# Patient Record
Sex: MALE | Race: WHITE | NOT HISPANIC OR LATINO | URBAN - METROPOLITAN AREA
[De-identification: names, ages, dates, MRNs, and addresses within clinical notes are randomized per-mention and may not be internally consistent; named-entity substitution may affect disease eponyms.]

---
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
## 2022-08-02 ENCOUNTER — OFFICE VISIT (OUTPATIENT)
Dept: FAMILY MEDICINE CLINIC | Facility: CLINIC | Age: 22
End: 2022-08-02

## 2022-08-02 VITALS
HEIGHT: 69 IN | OXYGEN SATURATION: 97 % | HEART RATE: 77 BPM | WEIGHT: 169 LBS | SYSTOLIC BLOOD PRESSURE: 120 MMHG | BODY MASS INDEX: 25.03 KG/M2 | TEMPERATURE: 97.8 F | DIASTOLIC BLOOD PRESSURE: 70 MMHG

## 2022-08-02 DIAGNOSIS — H72.92 RUPTURED EAR DRUM, LEFT: Primary | ICD-10-CM

## 2022-08-02 DIAGNOSIS — H92.02 OTALGIA OF LEFT EAR: ICD-10-CM

## 2022-08-02 DIAGNOSIS — H91.92 DECREASED HEARING OF LEFT EAR: ICD-10-CM

## 2022-08-02 PROCEDURE — 99213 OFFICE O/P EST LOW 20 MIN: CPT | Performed by: NURSE PRACTITIONER
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
## 2022-08-05 NOTE — PATIENT INSTRUCTIONS
Eardrum Rupture, Adult    An eardrum rupture is a hole (perforation) in the eardrum. The eardrum is a thin, round tissue inside of the ear that separates the ear canal from the middle ear. The eardrum is also called the tympanic membrane. It transfers sound vibrations through small bones in the middle ear to the hearing nerve in the inner ear. It also protects the middle ear from germs. An eardrum rupture can cause pain and hearing loss.  What are the causes?  This condition may be caused by:  An infection.  A sudden injury, such as from:  Inserting a thin, sharp object into the ear.  A hit to the side of the head, especially by an open hand.  Falling onto water or a flat surface.  A rapid change in pressure, such as from flying or scuba diving.  A sudden increase in pressure against the eardrum, such as from an explosion or a very loud noise.  Inserting a cotton-tipped swab in the ear.  A long-term eustachian tube disorder. Eustachian tubes are parts of the body that connect each middle ear space to the back of the nose.  A medical procedure or surgery, such as a procedure to remove wax from the ear canal.  Removing a man-made pressure equalization tube(PE tube) that was placed through the eardrum.  Having a PE tube fall out.  What increases the risk?  You are more likely to develop this condition if:  You have had PE tubes inserted in your ears.  You have an ear infection.  You play sports that:  Involve balls or contact with other players.  Take place in water, such as diving, scuba diving, or waterskiing.  What are the signs or symptoms?  Symptoms of this condition include:  Sudden pain at the time of the injury.  Ear pain that suddenly improves.  Ringing in the ear after the injury.  Drainage from the ear. The drainage may be clear, cloudy or pus-like, or bloody.  Hearing loss.  Dizziness.  How is this diagnosed?  This condition is diagnosed based on your symptoms and medical history as well as a physical  exam. Your health care provider can usually see a perforation using an ear scope (otoscope). You may have tests, such as:  A hearing test (audiogram) to check for hearing loss.  A test in which a sample of ear drainage is tested for infection (culture).  How is this treated?  An eardrum typically heals on its own within a few weeks. If your eardrum does not heal, your health care provider may recommend a procedure to place a patch over your eardrum or surgery to repair your eardrum. Your health care provider may also prescribe antibiotic medicines to help prevent infection.  If the ear heals completely, any hearing loss should be temporary.  Follow these instructions at home:  Keep your ear dry. This is very important. Follow instructions from your health care provider about how to keep your ear dry. You may need to wear waterproof earplugs when bathing and swimming.  Take over-the-counter and prescription medicines only as told by your health care provider.  Return to sports and activities as told by your health care provider. Ask your health care provider what activities are safe for you.  Wear headgear with ear protection when you play sports in which ear injuries are common.  If directed, apply heat to your affected ear as often as told by your health care provider. Use the heat source that your health care provider recommends, such as a moist heat pack or a heating pad. This will help to relieve pain.  Place a towel between your skin and the heat source.  Leave the heat on for 20-30 minutes.  Remove the heat if your skin turns bright red. This is especially important if you are unable to feel pain, heat, or cold. You may have a greater risk of getting burned.  Keep all follow-up visits as told by your health care provider. This is important.  Talk to your health care provider before traveling by plane.  Contact a health care provider if:  You have mucus or blood draining from your ear.  You have a fever.  You  have ear pain.  You have hearing loss, dizziness, or ringing in your ear.  Get help right away if:  You have sudden hearing loss.  You are very dizzy.  You have severe ear pain.  Your face feels weak or becomes paralyzed.  Summary  An eardrum rupture is a hole (perforation) in the eardrum that can cause pain and hearing loss. It is usually caused by a sudden injury to the ear.  The eardrum will likely heal on its own within a few weeks. In some cases, surgery may be necessary.  After the injury, follow instructions from your health care provider about how to keep your ear dry as it heals.  This information is not intended to replace advice given to you by your health care provider. Make sure you discuss any questions you have with your health care provider.  Document Revised: 11/30/2018 Document Reviewed: 02/23/2018  [employer] Patient Education © 2021 [employer]

## 2023-07-21 ENCOUNTER — PRE-ADMISSION TESTING (OUTPATIENT)
Dept: PREADMISSION TESTING | Facility: HOSPITAL | Age: 23
End: 2023-07-21
Payer: COMMERCIAL

## 2023-07-21 VITALS — WEIGHT: 171.3 LBS | BODY MASS INDEX: 25.37 KG/M2 | HEIGHT: 69 IN

## 2023-07-21 LAB
25(OH)D3 SERPL-MCNC: 36 NG/ML (ref 30–100)
ALBUMIN SERPL-MCNC: 5 G/DL (ref 3.5–5.2)
ALBUMIN/GLOB SERPL: 1.8 G/DL
ALP SERPL-CCNC: 74 U/L (ref 39–117)
ALT SERPL W P-5'-P-CCNC: 46 U/L (ref 1–41)
ANION GAP SERPL CALCULATED.3IONS-SCNC: 13 MMOL/L (ref 5–15)
APTT PPP: 25.7 SECONDS (ref 22–39)
AST SERPL-CCNC: 25 U/L (ref 1–40)
BASOPHILS # BLD AUTO: 0.04 10*3/MM3 (ref 0–0.2)
BASOPHILS NFR BLD AUTO: 0.6 % (ref 0–1.5)
BILIRUB SERPL-MCNC: 0.4 MG/DL (ref 0–1.2)
BUN SERPL-MCNC: 12 MG/DL (ref 6–20)
BUN/CREAT SERPL: 12.4 (ref 7–25)
CALCIUM SPEC-SCNC: 9.8 MG/DL (ref 8.6–10.5)
CHLORIDE SERPL-SCNC: 100 MMOL/L (ref 98–107)
CO2 SERPL-SCNC: 26 MMOL/L (ref 22–29)
CREAT SERPL-MCNC: 0.97 MG/DL (ref 0.76–1.27)
CRP SERPL-MCNC: 0.73 MG/DL (ref 0–0.5)
DEPRECATED RDW RBC AUTO: 41.6 FL (ref 37–54)
EGFRCR SERPLBLD CKD-EPI 2021: 113.9 ML/MIN/1.73
EOSINOPHIL # BLD AUTO: 0.16 10*3/MM3 (ref 0–0.4)
EOSINOPHIL NFR BLD AUTO: 2.6 % (ref 0.3–6.2)
ERYTHROCYTE [DISTWIDTH] IN BLOOD BY AUTOMATED COUNT: 12.9 % (ref 12.3–15.4)
ERYTHROCYTE [SEDIMENTATION RATE] IN BLOOD: 3 MM/HR (ref 0–15)
GLOBULIN UR ELPH-MCNC: 2.8 GM/DL
GLUCOSE SERPL-MCNC: 103 MG/DL (ref 65–99)
HBA1C MFR BLD: 5 % (ref 4.8–5.6)
HCT VFR BLD AUTO: 47 % (ref 37.5–51)
HGB BLD-MCNC: 15.7 G/DL (ref 13–17.7)
IMM GRANULOCYTES # BLD AUTO: 0.01 10*3/MM3 (ref 0–0.05)
IMM GRANULOCYTES NFR BLD AUTO: 0.2 % (ref 0–0.5)
INR PPP: 0.92 (ref 0.89–1.12)
LYMPHOCYTES # BLD AUTO: 1.96 10*3/MM3 (ref 0.7–3.1)
LYMPHOCYTES NFR BLD AUTO: 31.7 % (ref 19.6–45.3)
MCH RBC QN AUTO: 29.4 PG (ref 26.6–33)
MCHC RBC AUTO-ENTMCNC: 33.4 G/DL (ref 31.5–35.7)
MCV RBC AUTO: 88 FL (ref 79–97)
MONOCYTES # BLD AUTO: 0.71 10*3/MM3 (ref 0.1–0.9)
MONOCYTES NFR BLD AUTO: 11.5 % (ref 5–12)
NEUTROPHILS NFR BLD AUTO: 3.3 10*3/MM3 (ref 1.7–7)
NEUTROPHILS NFR BLD AUTO: 53.4 % (ref 42.7–76)
NRBC BLD AUTO-RTO: 0 /100 WBC (ref 0–0.2)
PLATELET # BLD AUTO: 275 10*3/MM3 (ref 140–450)
PMV BLD AUTO: 10.1 FL (ref 6–12)
POTASSIUM SERPL-SCNC: 3.9 MMOL/L (ref 3.5–5.2)
PROT SERPL-MCNC: 7.8 G/DL (ref 6–8.5)
PROTHROMBIN TIME: 12.5 SECONDS (ref 12.2–14.5)
QT INTERVAL: 380 MS
QTC INTERVAL: 404 MS
RBC # BLD AUTO: 5.34 10*6/MM3 (ref 4.14–5.8)
SODIUM SERPL-SCNC: 139 MMOL/L (ref 136–145)
WBC NRBC COR # BLD: 6.18 10*3/MM3 (ref 3.4–10.8)

## 2023-07-21 PROCEDURE — 93005 ELECTROCARDIOGRAM TRACING: CPT

## 2023-07-21 PROCEDURE — G0480 DRUG TEST DEF 1-7 CLASSES: HCPCS

## 2023-07-21 PROCEDURE — 85610 PROTHROMBIN TIME: CPT

## 2023-07-21 PROCEDURE — 80053 COMPREHEN METABOLIC PANEL: CPT

## 2023-07-21 PROCEDURE — 82306 VITAMIN D 25 HYDROXY: CPT

## 2023-07-21 PROCEDURE — 83036 HEMOGLOBIN GLYCOSYLATED A1C: CPT

## 2023-07-21 PROCEDURE — 85730 THROMBOPLASTIN TIME PARTIAL: CPT

## 2023-07-21 PROCEDURE — 36415 COLL VENOUS BLD VENIPUNCTURE: CPT

## 2023-07-21 PROCEDURE — 85025 COMPLETE CBC W/AUTO DIFF WBC: CPT

## 2023-07-21 PROCEDURE — 82985 ASSAY OF GLYCATED PROTEIN: CPT

## 2023-07-21 PROCEDURE — 85652 RBC SED RATE AUTOMATED: CPT

## 2023-07-21 PROCEDURE — 87081 CULTURE SCREEN ONLY: CPT

## 2023-07-21 PROCEDURE — 93010 ELECTROCARDIOGRAM REPORT: CPT | Performed by: INTERNAL MEDICINE

## 2023-07-21 PROCEDURE — 86140 C-REACTIVE PROTEIN: CPT

## 2023-07-21 NOTE — PAT
An arrival time for procedure was not provided during PAT visit. If patient had any questions or concerns about their arrival time, they were instructed to contact their surgeon/physician.  Additionally, if the patient referred to an arrival time that was acquired from their my chart account, patient was encouraged to verify that time with their surgeon/physician. Arrival times are NOT provided in Pre Admission Testing Department.    Discussed with patient options for receiving total joint replacement education and assessed patient's ability and preference. Joint Replacement Guide given to patient during PAT visit since not received a copy within the last year. Encouraged patient/family to read guide thoroughly and notify PAT staff with any questions or concerns. Handout provided directing patient to links to watch online videos related to joint replacement surgery on the [facility] website. The handout gives detailed instructions for joining an online joint replacement class through Zoom or phone conference offered on . Patient agreed to participate by watching videos online. Patient verbalized understanding of instructions and to complete the online learning tool survey. Encouraged to share information with family and/or . An overview of the joint replacement education was provided during the visit including general perioperative instructions that are routine for all surgical patients (PAT PASS, wipes, directions to pre-op, etc.).    Patient instructed to drink 20 ounces of Gatorade and it needs to be completed 1 hour (for Main OR patients) or 2 hours (scheduled  section & [facility] patients) before given arrival time for procedure (NO RED Gatorade)    Patient verbalized understanding.    Patient denies any current skin issues.     Patient to apply READY BATH LUXE wipes (allergic to CHG) to surgical area (as instructed) the night before procedure and the AM of procedure. Wipes  provided.    Post-Surgery Information Instruction Sheet given to patient during Pre-Admission Testing Visit with verbal instructions to patient to return with PAT PASS on the day of surgery. Additionally, encouraged patient to review the information provided.    EKG from PAT today faxed to anesthesiology department for review and cardiac clearance. RN spoke with Dr. Lee and reviewed pertinent medical history and EKG results.  Per Dr Lee, patient is cleared to proceed with procedure as planned without additional cardiac testing. Patient denies chest pain or increased shortness of breath.

## 2023-07-22 LAB
FRUCTOSAMINE SERPL-SCNC: 215 UMOL/L (ref 0–285)
MRSA SPEC QL CULT: NORMAL

## 2023-07-25 LAB
COTININE SERPL-MCNC: 184.1 NG/ML
NICOTINE SERPL-MCNC: 22.7 NG/ML

## 2023-08-02 ENCOUNTER — ANESTHESIA (OUTPATIENT)
Dept: PERIOP | Facility: HOSPITAL | Age: 23
End: 2023-08-02
Payer: COMMERCIAL

## 2023-08-02 ENCOUNTER — APPOINTMENT (OUTPATIENT)
Dept: GENERAL RADIOLOGY | Facility: HOSPITAL | Age: 23
End: 2023-08-02
Payer: COMMERCIAL

## 2023-08-02 ENCOUNTER — HOSPITAL ENCOUNTER (OUTPATIENT)
Facility: HOSPITAL | Age: 23
Discharge: HOME | End: 2023-08-02
Attending: ORTHOPAEDIC SURGERY | Admitting: ORTHOPAEDIC SURGERY
Payer: COMMERCIAL

## 2023-08-02 ENCOUNTER — ANESTHESIA EVENT (OUTPATIENT)
Dept: PERIOP | Facility: HOSPITAL | Age: 23
End: 2023-08-02
Payer: COMMERCIAL

## 2023-08-02 VITALS
DIASTOLIC BLOOD PRESSURE: 51 MMHG | OXYGEN SATURATION: 100 % | HEIGHT: 69 IN | HEART RATE: 63 BPM | WEIGHT: 171 LBS | TEMPERATURE: 97.8 F | RESPIRATION RATE: 16 BRPM | BODY MASS INDEX: 25.33 KG/M2 | SYSTOLIC BLOOD PRESSURE: 99 MMHG

## 2023-08-02 DIAGNOSIS — Z96.642 STATUS POST TOTAL REPLACEMENT OF LEFT HIP: Primary | ICD-10-CM

## 2023-08-02 PROBLEM — M87.052 AVASCULAR NECROSIS OF BONE OF LEFT HIP: Status: ACTIVE | Noted: 2023-08-02

## 2023-08-02 PROCEDURE — 97165 OT EVAL LOW COMPLEX 30 MIN: CPT | Performed by: OCCUPATIONAL THERAPIST

## 2023-08-02 PROCEDURE — 25010000002 CEFAZOLIN IN DEXTROSE 2-4 GM/100ML-% SOLUTION: Performed by: ORTHOPAEDIC SURGERY

## 2023-08-02 PROCEDURE — 25010000002 CLONIDINE PER 1 MG: Performed by: ORTHOPAEDIC SURGERY

## 2023-08-02 PROCEDURE — 25010000002 BUPIVACAINE (PF) 0.25 % SOLUTION 30 ML VIAL: Performed by: ORTHOPAEDIC SURGERY

## 2023-08-02 PROCEDURE — 25010000002 ONDANSETRON PER 1 MG: Performed by: ANESTHESIOLOGY

## 2023-08-02 PROCEDURE — C1713 ANCHOR/SCREW BN/BN,TIS/BN: HCPCS | Performed by: ORTHOPAEDIC SURGERY

## 2023-08-02 PROCEDURE — 73502 X-RAY EXAM HIP UNI 2-3 VIEWS: CPT

## 2023-08-02 PROCEDURE — 25010000002 KETOROLAC TROMETHAMINE PER 15 MG: Performed by: ORTHOPAEDIC SURGERY

## 2023-08-02 PROCEDURE — 0 MEPERIDINE PER 100 MG

## 2023-08-02 PROCEDURE — 25010000002 CEFAZOLIN IN DEXTROSE 2000 MG/ 100 ML SOLUTION: Performed by: ORTHOPAEDIC SURGERY

## 2023-08-02 PROCEDURE — 97535 SELF CARE MNGMENT TRAINING: CPT | Performed by: OCCUPATIONAL THERAPIST

## 2023-08-02 PROCEDURE — C1776 JOINT DEVICE (IMPLANTABLE): HCPCS | Performed by: ORTHOPAEDIC SURGERY

## 2023-08-02 PROCEDURE — 25010000002 FENTANYL CITRATE (PF) 50 MCG/ML SOLUTION

## 2023-08-02 PROCEDURE — 76000 FLUOROSCOPY <1 HR PHYS/QHP: CPT

## 2023-08-02 PROCEDURE — 27130 TOTAL HIP ARTHROPLASTY: CPT | Performed by: PHYSICIAN ASSISTANT

## 2023-08-02 PROCEDURE — 97161 PT EVAL LOW COMPLEX 20 MIN: CPT

## 2023-08-02 PROCEDURE — 25010000002 VANCOMYCIN 1 G RECONSTITUTED SOLUTION: Performed by: ORTHOPAEDIC SURGERY

## 2023-08-02 PROCEDURE — 25010000002 DEXAMETHASONE PER 1 MG: Performed by: ANESTHESIOLOGY

## 2023-08-02 PROCEDURE — 25010000002 PROPOFOL 10 MG/ML EMULSION: Performed by: ANESTHESIOLOGY

## 2023-08-02 PROCEDURE — 25010000002 HYDROMORPHONE 1 MG/ML SOLUTION

## 2023-08-02 PROCEDURE — 0 MEPIVACAINE HCL (PF) 1.5 % SOLUTION: Performed by: ANESTHESIOLOGY

## 2023-08-02 PROCEDURE — 97530 THERAPEUTIC ACTIVITIES: CPT

## 2023-08-02 RX ORDER — TRANEXAMIC ACID 10 MG/ML
1000 INJECTION, SOLUTION INTRAVENOUS ONCE
Status: COMPLETED | OUTPATIENT
Start: 2023-08-02 | End: 2023-08-02

## 2023-08-02 RX ORDER — MELOXICAM 15 MG/1
15 TABLET ORAL DAILY
Status: DISCONTINUED | OUTPATIENT
Start: 2023-08-03 | End: 2023-08-02 | Stop reason: HOSPADM

## 2023-08-02 RX ORDER — ONDANSETRON 2 MG/ML
4 INJECTION INTRAMUSCULAR; INTRAVENOUS EVERY 6 HOURS PRN
Status: DISCONTINUED | OUTPATIENT
Start: 2023-08-02 | End: 2023-08-02 | Stop reason: HOSPADM

## 2023-08-02 RX ORDER — MAGNESIUM HYDROXIDE 1200 MG/15ML
LIQUID ORAL AS NEEDED
Status: DISCONTINUED | OUTPATIENT
Start: 2023-08-02 | End: 2023-08-02 | Stop reason: HOSPADM

## 2023-08-02 RX ORDER — TRAMADOL HYDROCHLORIDE 50 MG/1
50 TABLET ORAL EVERY 8 HOURS PRN
Start: 2023-08-02 | End: 2023-08-09

## 2023-08-02 RX ORDER — HYDROCODONE BITARTRATE AND ACETAMINOPHEN 5; 325 MG/1; MG/1
1 TABLET ORAL ONCE AS NEEDED
Status: DISCONTINUED | OUTPATIENT
Start: 2023-08-02 | End: 2023-08-02

## 2023-08-02 RX ORDER — SODIUM CHLORIDE 9 MG/ML
40 INJECTION, SOLUTION INTRAVENOUS AS NEEDED
Status: DISCONTINUED | OUTPATIENT
Start: 2023-08-02 | End: 2023-08-02

## 2023-08-02 RX ORDER — IPRATROPIUM BROMIDE AND ALBUTEROL SULFATE 2.5; .5 MG/3ML; MG/3ML
3 SOLUTION RESPIRATORY (INHALATION) ONCE AS NEEDED
Status: DISCONTINUED | OUTPATIENT
Start: 2023-08-02 | End: 2023-08-02

## 2023-08-02 RX ORDER — SODIUM CHLORIDE 9 MG/ML
40 INJECTION, SOLUTION INTRAVENOUS AS NEEDED
Status: CANCELLED | OUTPATIENT
Start: 2023-08-02

## 2023-08-02 RX ORDER — DOXYCYCLINE HYCLATE 100 MG/1
100 CAPSULE ORAL 2 TIMES DAILY
Start: 2023-08-02

## 2023-08-02 RX ORDER — SODIUM CHLORIDE, SODIUM LACTATE, POTASSIUM CHLORIDE, CALCIUM CHLORIDE 600; 310; 30; 20 MG/100ML; MG/100ML; MG/100ML; MG/100ML
100 INJECTION, SOLUTION INTRAVENOUS CONTINUOUS
Status: DISCONTINUED | OUTPATIENT
Start: 2023-08-02 | End: 2023-08-02 | Stop reason: HOSPADM

## 2023-08-02 RX ORDER — FAMOTIDINE 20 MG/1
20 TABLET, FILM COATED ORAL
Status: COMPLETED | OUTPATIENT
Start: 2023-08-02 | End: 2023-08-02

## 2023-08-02 RX ORDER — SODIUM CHLORIDE 0.9 % (FLUSH) 0.9 %
10 SYRINGE (ML) INJECTION AS NEEDED
Status: CANCELLED | OUTPATIENT
Start: 2023-08-02

## 2023-08-02 RX ORDER — LABETALOL HYDROCHLORIDE 5 MG/ML
5 INJECTION, SOLUTION INTRAVENOUS
Status: DISCONTINUED | OUTPATIENT
Start: 2023-08-02 | End: 2023-08-02

## 2023-08-02 RX ORDER — SODIUM CHLORIDE 0.9 % (FLUSH) 0.9 %
3 SYRINGE (ML) INJECTION EVERY 12 HOURS SCHEDULED
Status: DISCONTINUED | OUTPATIENT
Start: 2023-08-02 | End: 2023-08-02

## 2023-08-02 RX ORDER — MELOXICAM 15 MG/1
15 TABLET ORAL ONCE
Status: COMPLETED | OUTPATIENT
Start: 2023-08-02 | End: 2023-08-02

## 2023-08-02 RX ORDER — ONDANSETRON 2 MG/ML
4 INJECTION INTRAMUSCULAR; INTRAVENOUS EVERY 6 HOURS PRN
Start: 2023-08-02

## 2023-08-02 RX ORDER — HYDRALAZINE HYDROCHLORIDE 20 MG/ML
5 INJECTION INTRAMUSCULAR; INTRAVENOUS
Status: DISCONTINUED | OUTPATIENT
Start: 2023-08-02 | End: 2023-08-02

## 2023-08-02 RX ORDER — NALOXONE HCL 0.4 MG/ML
0.4 VIAL (ML) INJECTION AS NEEDED
Status: DISCONTINUED | OUTPATIENT
Start: 2023-08-02 | End: 2023-08-02

## 2023-08-02 RX ORDER — LIDOCAINE HYDROCHLORIDE 10 MG/ML
0.5 INJECTION, SOLUTION EPIDURAL; INFILTRATION; INTRACAUDAL; PERINEURAL ONCE AS NEEDED
Status: COMPLETED | OUTPATIENT
Start: 2023-08-02 | End: 2023-08-02

## 2023-08-02 RX ORDER — ACETAMINOPHEN 500 MG
1000 TABLET ORAL ONCE
Status: COMPLETED | OUTPATIENT
Start: 2023-08-02 | End: 2023-08-02

## 2023-08-02 RX ORDER — ONDANSETRON 2 MG/ML
4 INJECTION INTRAMUSCULAR; INTRAVENOUS ONCE AS NEEDED
Status: DISCONTINUED | OUTPATIENT
Start: 2023-08-02 | End: 2023-08-02

## 2023-08-02 RX ORDER — ASPIRIN 81 MG/1
81 TABLET ORAL EVERY 12 HOURS SCHEDULED
Start: 2023-08-03

## 2023-08-02 RX ORDER — SODIUM CHLORIDE 0.9 % (FLUSH) 0.9 %
3-10 SYRINGE (ML) INJECTION AS NEEDED
Status: DISCONTINUED | OUTPATIENT
Start: 2023-08-02 | End: 2023-08-02

## 2023-08-02 RX ORDER — CEFAZOLIN SODIUM 2 G/100ML
2 INJECTION, SOLUTION INTRAVENOUS EVERY 8 HOURS
Status: DISCONTINUED | OUTPATIENT
Start: 2023-08-02 | End: 2023-08-02 | Stop reason: HOSPADM

## 2023-08-02 RX ORDER — FENTANYL CITRATE 50 UG/ML
INJECTION, SOLUTION INTRAMUSCULAR; INTRAVENOUS
Status: COMPLETED
Start: 2023-08-02 | End: 2023-08-02

## 2023-08-02 RX ORDER — SODIUM CHLORIDE, SODIUM LACTATE, POTASSIUM CHLORIDE, CALCIUM CHLORIDE 600; 310; 30; 20 MG/100ML; MG/100ML; MG/100ML; MG/100ML
9 INJECTION, SOLUTION INTRAVENOUS CONTINUOUS PRN
Status: DISCONTINUED | OUTPATIENT
Start: 2023-08-02 | End: 2023-08-02 | Stop reason: HOSPADM

## 2023-08-02 RX ORDER — DROPERIDOL 2.5 MG/ML
0.62 INJECTION, SOLUTION INTRAMUSCULAR; INTRAVENOUS
Status: DISCONTINUED | OUTPATIENT
Start: 2023-08-02 | End: 2023-08-02

## 2023-08-02 RX ORDER — ONDANSETRON 2 MG/ML
INJECTION INTRAMUSCULAR; INTRAVENOUS AS NEEDED
Status: DISCONTINUED | OUTPATIENT
Start: 2023-08-02 | End: 2023-08-02 | Stop reason: SURG

## 2023-08-02 RX ORDER — PROPOFOL 10 MG/ML
VIAL (ML) INTRAVENOUS AS NEEDED
Status: DISCONTINUED | OUTPATIENT
Start: 2023-08-02 | End: 2023-08-02 | Stop reason: SURG

## 2023-08-02 RX ORDER — OXYCODONE HYDROCHLORIDE 10 MG/1
10 TABLET ORAL EVERY 4 HOURS PRN
Status: DISCONTINUED | OUTPATIENT
Start: 2023-08-02 | End: 2023-08-02

## 2023-08-02 RX ORDER — PROMETHAZINE HYDROCHLORIDE 25 MG/1
25 TABLET ORAL ONCE AS NEEDED
Status: DISCONTINUED | OUTPATIENT
Start: 2023-08-02 | End: 2023-08-02

## 2023-08-02 RX ORDER — MEPERIDINE HYDROCHLORIDE 25 MG/ML
12.5 INJECTION INTRAMUSCULAR; INTRAVENOUS; SUBCUTANEOUS
Status: DISCONTINUED | OUTPATIENT
Start: 2023-08-02 | End: 2023-08-02

## 2023-08-02 RX ORDER — SODIUM CHLORIDE 0.9 % (FLUSH) 0.9 %
10 SYRINGE (ML) INJECTION EVERY 12 HOURS SCHEDULED
Status: CANCELLED | OUTPATIENT
Start: 2023-08-02

## 2023-08-02 RX ORDER — CEFAZOLIN SODIUM 2 G/100ML
2000 INJECTION, SOLUTION INTRAVENOUS ONCE
Status: COMPLETED | OUTPATIENT
Start: 2023-08-02 | End: 2023-08-02

## 2023-08-02 RX ORDER — MELOXICAM 15 MG/1
15 TABLET ORAL DAILY
Start: 2023-08-03

## 2023-08-02 RX ORDER — ASPIRIN 81 MG/1
81 TABLET ORAL EVERY 12 HOURS SCHEDULED
Status: DISCONTINUED | OUTPATIENT
Start: 2023-08-03 | End: 2023-08-02 | Stop reason: HOSPADM

## 2023-08-02 RX ORDER — OXYCODONE HYDROCHLORIDE 5 MG/1
5 TABLET ORAL EVERY 4 HOURS PRN
Refills: 0
Start: 2023-08-02 | End: 2023-08-12

## 2023-08-02 RX ORDER — DOCUSATE SODIUM 100 MG/1
100 CAPSULE, LIQUID FILLED ORAL 2 TIMES DAILY
Qty: 60 CAPSULE | Refills: 0
Start: 2023-08-02

## 2023-08-02 RX ORDER — MEPERIDINE HYDROCHLORIDE 25 MG/ML
INJECTION INTRAMUSCULAR; INTRAVENOUS; SUBCUTANEOUS
Status: COMPLETED
Start: 2023-08-02 | End: 2023-08-02

## 2023-08-02 RX ORDER — HYDROMORPHONE HYDROCHLORIDE 1 MG/ML
0.5 INJECTION, SOLUTION INTRAMUSCULAR; INTRAVENOUS; SUBCUTANEOUS
Status: DISCONTINUED | OUTPATIENT
Start: 2023-08-02 | End: 2023-08-02

## 2023-08-02 RX ORDER — TRAMADOL HYDROCHLORIDE 50 MG/1
50 TABLET ORAL EVERY 8 HOURS PRN
Status: DISCONTINUED | OUTPATIENT
Start: 2023-08-02 | End: 2023-08-02 | Stop reason: HOSPADM

## 2023-08-02 RX ORDER — DEXAMETHASONE SODIUM PHOSPHATE 4 MG/ML
INJECTION, SOLUTION INTRA-ARTICULAR; INTRALESIONAL; INTRAMUSCULAR; INTRAVENOUS; SOFT TISSUE AS NEEDED
Status: DISCONTINUED | OUTPATIENT
Start: 2023-08-02 | End: 2023-08-02 | Stop reason: SURG

## 2023-08-02 RX ORDER — ACETAMINOPHEN 500 MG
1000 TABLET ORAL EVERY 8 HOURS
Status: DISCONTINUED | OUTPATIENT
Start: 2023-08-02 | End: 2023-08-02 | Stop reason: HOSPADM

## 2023-08-02 RX ORDER — LIDOCAINE HYDROCHLORIDE 10 MG/ML
INJECTION, SOLUTION EPIDURAL; INFILTRATION; INTRACAUDAL; PERINEURAL AS NEEDED
Status: DISCONTINUED | OUTPATIENT
Start: 2023-08-02 | End: 2023-08-02 | Stop reason: SURG

## 2023-08-02 RX ORDER — EPHEDRINE SULFATE 50 MG/ML
INJECTION INTRAVENOUS AS NEEDED
Status: DISCONTINUED | OUTPATIENT
Start: 2023-08-02 | End: 2023-08-02 | Stop reason: SURG

## 2023-08-02 RX ORDER — ACETAMINOPHEN 500 MG
1000 TABLET ORAL EVERY 8 HOURS
Start: 2023-08-02

## 2023-08-02 RX ORDER — VANCOMYCIN HYDROCHLORIDE 1 G/20ML
INJECTION, POWDER, LYOPHILIZED, FOR SOLUTION INTRAVENOUS AS NEEDED
Status: DISCONTINUED | OUTPATIENT
Start: 2023-08-02 | End: 2023-08-02 | Stop reason: HOSPADM

## 2023-08-02 RX ORDER — DEXMEDETOMIDINE HYDROCHLORIDE 100 UG/ML
INJECTION, SOLUTION INTRAVENOUS AS NEEDED
Status: DISCONTINUED | OUTPATIENT
Start: 2023-08-02 | End: 2023-08-02 | Stop reason: SURG

## 2023-08-02 RX ORDER — KETOROLAC TROMETHAMINE 15 MG/ML
15 INJECTION, SOLUTION INTRAMUSCULAR; INTRAVENOUS EVERY 6 HOURS PRN
Status: DISCONTINUED | OUTPATIENT
Start: 2023-08-02 | End: 2023-08-02 | Stop reason: HOSPADM

## 2023-08-02 RX ORDER — MIDAZOLAM HYDROCHLORIDE 1 MG/ML
1 INJECTION INTRAMUSCULAR; INTRAVENOUS
Status: DISCONTINUED | OUTPATIENT
Start: 2023-08-02 | End: 2023-08-02 | Stop reason: HOSPADM

## 2023-08-02 RX ORDER — OXYCODONE HYDROCHLORIDE 5 MG/1
5 TABLET ORAL EVERY 4 HOURS PRN
Status: DISCONTINUED | OUTPATIENT
Start: 2023-08-02 | End: 2023-08-02 | Stop reason: HOSPADM

## 2023-08-02 RX ORDER — PROMETHAZINE HYDROCHLORIDE 25 MG/1
25 SUPPOSITORY RECTAL ONCE AS NEEDED
Status: DISCONTINUED | OUTPATIENT
Start: 2023-08-02 | End: 2023-08-02

## 2023-08-02 RX ORDER — FENTANYL CITRATE 50 UG/ML
50 INJECTION, SOLUTION INTRAMUSCULAR; INTRAVENOUS
Status: DISCONTINUED | OUTPATIENT
Start: 2023-08-02 | End: 2023-08-02

## 2023-08-02 RX ORDER — OXYCODONE HYDROCHLORIDE 10 MG/1
10 TABLET ORAL EVERY 4 HOURS PRN
Status: DISCONTINUED | OUTPATIENT
Start: 2023-08-02 | End: 2023-08-02 | Stop reason: HOSPADM

## 2023-08-02 RX ORDER — TRAMADOL HYDROCHLORIDE 50 MG/1
50 TABLET ORAL EVERY 8 HOURS PRN
Status: DISCONTINUED | OUTPATIENT
Start: 2023-08-02 | End: 2023-08-02

## 2023-08-02 RX ORDER — LABETALOL HYDROCHLORIDE 5 MG/ML
10 INJECTION, SOLUTION INTRAVENOUS EVERY 4 HOURS PRN
Status: DISCONTINUED | OUTPATIENT
Start: 2023-08-02 | End: 2023-08-02 | Stop reason: HOSPADM

## 2023-08-02 RX ORDER — DROPERIDOL 2.5 MG/ML
0.62 INJECTION, SOLUTION INTRAMUSCULAR; INTRAVENOUS ONCE AS NEEDED
Status: DISCONTINUED | OUTPATIENT
Start: 2023-08-02 | End: 2023-08-02

## 2023-08-02 RX ORDER — NALOXONE HCL 0.4 MG/ML
0.1 VIAL (ML) INJECTION
Status: DISCONTINUED | OUTPATIENT
Start: 2023-08-02 | End: 2023-08-02 | Stop reason: HOSPADM

## 2023-08-02 RX ADMIN — TRANEXAMIC ACID 1000 MG: 10 INJECTION, SOLUTION INTRAVENOUS at 08:00

## 2023-08-02 RX ADMIN — DEXMEDETOMIDINE HYDROCHLORIDE 8 MCG: 100 INJECTION, SOLUTION INTRAVENOUS at 10:20

## 2023-08-02 RX ADMIN — HYDROMORPHONE HYDROCHLORIDE 0.5 MG: 1 INJECTION, SOLUTION INTRAMUSCULAR; INTRAVENOUS; SUBCUTANEOUS at 11:10

## 2023-08-02 RX ADMIN — FAMOTIDINE 20 MG: 20 TABLET ORAL at 07:02

## 2023-08-02 RX ADMIN — DEXAMETHASONE SODIUM PHOSPHATE 8 MG: 4 INJECTION, SOLUTION INTRAMUSCULAR; INTRAVENOUS at 08:10

## 2023-08-02 RX ADMIN — PROPOFOL 50 MG: 10 INJECTION, EMULSION INTRAVENOUS at 08:16

## 2023-08-02 RX ADMIN — LIDOCAINE HYDROCHLORIDE 0.5 ML: 10 INJECTION, SOLUTION EPIDURAL; INFILTRATION; INTRACAUDAL; PERINEURAL at 06:55

## 2023-08-02 RX ADMIN — DEXMEDETOMIDINE HYDROCHLORIDE 4 MCG: 100 INJECTION, SOLUTION INTRAVENOUS at 11:02

## 2023-08-02 RX ADMIN — PROPOFOL 50 MG: 10 INJECTION, EMULSION INTRAVENOUS at 09:51

## 2023-08-02 RX ADMIN — TRANEXAMIC ACID 1000 MG: 10 INJECTION, SOLUTION INTRAVENOUS at 10:18

## 2023-08-02 RX ADMIN — FENTANYL CITRATE 50 MCG: 50 INJECTION, SOLUTION INTRAMUSCULAR; INTRAVENOUS at 11:45

## 2023-08-02 RX ADMIN — ACETAMINOPHEN 1000 MG: 500 TABLET ORAL at 12:45

## 2023-08-02 RX ADMIN — PROPOFOL 50 MG: 10 INJECTION, EMULSION INTRAVENOUS at 09:27

## 2023-08-02 RX ADMIN — SODIUM CHLORIDE, POTASSIUM CHLORIDE, SODIUM LACTATE AND CALCIUM CHLORIDE 100 ML/HR: 600; 310; 30; 20 INJECTION, SOLUTION INTRAVENOUS at 12:48

## 2023-08-02 RX ADMIN — HYDROMORPHONE HYDROCHLORIDE 0.5 MG: 1 INJECTION, SOLUTION INTRAMUSCULAR; INTRAVENOUS; SUBCUTANEOUS at 11:48

## 2023-08-02 RX ADMIN — PROPOFOL 150 MCG/KG/MIN: 10 INJECTION, EMULSION INTRAVENOUS at 07:53

## 2023-08-02 RX ADMIN — LIDOCAINE HYDROCHLORIDE 50 MG: 10 INJECTION, SOLUTION EPIDURAL; INFILTRATION; INTRACAUDAL; PERINEURAL at 07:52

## 2023-08-02 RX ADMIN — MEPIVACAINE HYDROCHLORIDE 4 ML: 15 INJECTION, SOLUTION EPIDURAL; INFILTRATION at 07:51

## 2023-08-02 RX ADMIN — MELOXICAM 15 MG: 15 TABLET ORAL at 07:02

## 2023-08-02 RX ADMIN — ONDANSETRON 4 MG: 2 INJECTION INTRAMUSCULAR; INTRAVENOUS at 10:21

## 2023-08-02 RX ADMIN — SODIUM CHLORIDE, POTASSIUM CHLORIDE, SODIUM LACTATE AND CALCIUM CHLORIDE 9 ML/HR: 600; 310; 30; 20 INJECTION, SOLUTION INTRAVENOUS at 06:55

## 2023-08-02 RX ADMIN — OXYCODONE HYDROCHLORIDE 5 MG: 5 TABLET ORAL at 15:53

## 2023-08-02 RX ADMIN — MEPERIDINE HYDROCHLORIDE 12.5 MG: 25 INJECTION INTRAMUSCULAR; INTRAVENOUS; SUBCUTANEOUS at 11:20

## 2023-08-02 RX ADMIN — ACETAMINOPHEN 1000 MG: 500 TABLET ORAL at 07:02

## 2023-08-02 RX ADMIN — PROPOFOL 50 MG: 10 INJECTION, EMULSION INTRAVENOUS at 10:16

## 2023-08-02 RX ADMIN — CEFAZOLIN SODIUM 2000 MG: 2 INJECTION, SOLUTION INTRAVENOUS at 07:45

## 2023-08-02 RX ADMIN — LIDOCAINE HYDROCHLORIDE 50 MG: 10 INJECTION, SOLUTION EPIDURAL; INFILTRATION; INTRACAUDAL; PERINEURAL at 07:38

## 2023-08-02 RX ADMIN — SODIUM CHLORIDE, POTASSIUM CHLORIDE, SODIUM LACTATE AND CALCIUM CHLORIDE: 600; 310; 30; 20 INJECTION, SOLUTION INTRAVENOUS at 09:10

## 2023-08-02 RX ADMIN — PROPOFOL 50 MG: 10 INJECTION, EMULSION INTRAVENOUS at 07:38

## 2023-08-02 RX ADMIN — EPHEDRINE SULFATE 10 MG: 50 INJECTION INTRAVENOUS at 08:03

## 2023-08-02 RX ADMIN — PROPOFOL 50 MG: 10 INJECTION, EMULSION INTRAVENOUS at 07:53

## 2023-08-02 RX ADMIN — OXYCODONE HYDROCHLORIDE 5 MG: 5 TABLET ORAL at 12:45

## 2023-08-02 RX ADMIN — CEFAZOLIN SODIUM 2 G: 2 INJECTION, SOLUTION INTRAVENOUS at 15:37

## 2023-08-02 NOTE — PLAN OF CARE
Problem: Adult Inpatient Plan of Care  Goal: Absence of Hospital-Acquired Illness or Injury  Intervention: Identify and Manage Fall Risk  Recent Flowsheet Documentation  Taken 8/2/2023 1220 by Evelyn Osman RN  Safety Promotion/Fall Prevention:   activity supervised   clutter free environment maintained   assistive device/personal items within reach   toileting scheduled   safety round/check completed   room organization consistent   nonskid shoes/slippers when out of bed   gait belt   fall prevention program maintained  Intervention: Prevent and Manage VTE (Venous Thromboembolism) Risk  Recent Flowsheet Documentation  Taken 8/2/2023 1220 by Evelyn Osman RN  Activity Management: activity encouraged  VTE Prevention/Management:   bilateral   sequential compression devices on  Goal: Optimal Comfort and Wellbeing  Intervention: Provide Person-Centered Care  Recent Flowsheet Documentation  Taken 8/2/2023 1220 by Evelyn Osman RN  Trust Relationship/Rapport:   care explained   reassurance provided     Problem: Adult Inpatient Plan of Care  Goal: Absence of Hospital-Acquired Illness or Injury  Intervention: Identify and Manage Fall Risk  Recent Flowsheet Documentation  Taken 8/2/2023 1220 by Evelyn Osman RN  Safety Promotion/Fall Prevention:   activity supervised   clutter free environment maintained   assistive device/personal items within reach   toileting scheduled   safety round/check completed   room organization consistent   nonskid shoes/slippers when out of bed   gait belt   fall prevention program maintained  Intervention: Prevent and Manage VTE (Venous Thromboembolism) Risk  Recent Flowsheet Documentation  Taken 8/2/2023 1220 by Evelyn Osman RN  Activity Management: activity encouraged  VTE Prevention/Management:   bilateral   sequential compression devices on  Goal: Optimal Comfort and Wellbeing  Intervention: Provide Person-Centered Care  Recent Flowsheet  Documentation  Taken 8/2/2023 1220 by Evelyn Osman, RN  Trust Relationship/Rapport:   care explained   reassurance provided   Goal Outcome Evaluation:

## 2023-08-02 NOTE — PLAN OF CARE
Goal Outcome Evaluation:  Plan of Care Reviewed With: patient, parent           Outcome Evaluation: OT educated pt and family on ADL retraining including AE use, incorporation of segundo-dressing technique and transfer training. He completed transfer training wtih CGA, LB dressing with min servando-CGA. Pt is performing below baseline status d/t decreased balance, pain and decreased LLE AROM. Recommend DC home with initial 24/7 assist.      Anticipated Discharge Disposition (OT): home with 24/7 care

## 2023-08-02 NOTE — THERAPY EVALUATION
Patient Name: Devang Kelly  : 2000    MRN: [id number]                              Today's Date: 2023       Admit Date: 2023    Visit Dx: No diagnosis found.  Patient Active Problem List   Diagnosis    Congenital dysplasia of left hip    Current moderate episode of major depressive disorder    Mood disorder    Avascular necrosis of bone of left hip     Past Medical History:   Diagnosis Date    Arthritis     Congenital hip dysplasia     LEFT ONLY    Depression 2020    Fracture     right radial    History of blood transfusion     Tanner Loaiza - at birth, denies transfusion reaction    Wears glasses      Past Surgical History:   Procedure Laterality Date    EPIPHYSEAL ARREST      HIP SURGERY Left     at birth    HYPOSPADIAS CORRECTION      KNEE SURGERY Right     growth plate removed from right knee to correct leg length discrepancy    TONSILLECTOMY        General Information       Row Name 23 1502          OT Time and Intention    Document Type evaluation  -AR     Mode of Treatment individual therapy;occupational therapy  -AR       Row Name 23 1502          General Information    Patient Profile Reviewed yes  -AR     Prior Level of Function min assist:;all household mobility;community mobility;gait;transfer;ADL's  -AR     Existing Precautions/Restrictions fall;hip, anterior;left  -AR     Barriers to Rehab previous functional deficit  -AR       Row Name 23 1502          Living Environment    People in Home parent(s)  -AR       Row Name 23 1502          Home Main Entrance    Number of Stairs, Main Entrance none  -AR       Row Name 23 1502          Stairs Within Home, Primary    Number of Stairs, Within Home, Primary none  -AR     Stairs Comment, Within Home, Primary Ptr has walk-on shower and raised commode seat with handles  -AR       Row Name 23 1502          Cognition    Orientation Status (Cognition) oriented x 4  -AR       Row Name  08/02/23 1502          Safety Issues, Functional Mobility    Safety Issues Affecting Function (Mobility) awareness of need for assistance;safety precautions follow-through/compliance;safety precaution awareness  -AR     Impairments Affecting Function (Mobility) balance;endurance/activity tolerance;pain;strength;range of motion (ROM);sensation/sensory awareness  -AR               User Key  (r) = Recorded By, (t) = Taken By, (c) = Cosigned By      Initials Name Provider Type    Dasia Mosher OT Occupational Therapist                     Mobility/ADL's       Row Name 08/02/23 1504          Bed Mobility    Comment, (Bed Mobility) Issued leg  and educated pt on use  -AR       Row Name 08/02/23 1504          Transfers    Transfers sit-stand transfer;stand-sit transfer  -AR       Row Name 08/02/23 1504          Sit-Stand Transfer    Sit-Stand [place] (Transfers) contact guard;verbal cues  -AR     Assistive Device (Sit-Stand Transfers) walker, front-wheeled  -AR       Row Name 08/02/23 1504          Stand-Sit Transfer    Stand-Sit [place] (Transfers) contact guard;verbal cues  -AR     Assistive Device (Stand-Sit Transfers) walker, front-wheeled  -AR       Row Name 08/02/23 1504          Activities of Daily Living    BADL Assessment/Intervention bathing;upper body dressing;lower body dressing  -AR       Row Name 08/02/23 1504          Mobility    Extremity Weight-bearing Status left lower extremity  -AR     Left Lower Extremity (Weight-bearing Status) weight-bearing as tolerated (WBAT)  -AR       Row Name 08/02/23 1504          Bathing Assessment/Intervention    Comment, (Bathing) Issued LH sponge to assist at home, educated on use  -AR       Row Name 08/02/23 1504          Lower Body Dressing Assessment/Training    [place] Level (Lower Body Dressing) doff;don;socks;contact guard assist;pants/bottoms;verbal cues;shoes/slippers;minimum assist (75% patient effort)  -AR     Assistive Devices  (Lower Body Dressing) reacher;sock-aid  -AR     Position (Lower Body Dressing) supported standing;unsupported sitting  -AR     Comment, (Lower Body Dressing) Educated pt on ADL retraining including AE use and incorporation of segundo-dressing technique for comfort. Deferred dressing without AE use per pt request.  -AR               User Key  (r) = Recorded By, (t) = Taken By, (c) = Cosigned By      Initials Name Provider Type    Dasia Mosher OT Occupational Therapist                   Obj/Interventions       Row Name 08/02/23 1506          Sensory Assessment (Somatosensory)    Sensory Assessment (Somatosensory) UE sensation intact  -AR       [facility] Name 08/02/23 1506          Range of Motion Comprehensive    General Range of Motion no range of motion deficits identified  -AR       Row Name 08/02/23 1506          Strength Comprehensive (MMT)    General Manual Muscle Testing (MMT) Assessment no strength deficits identified  -AR     Comment, General Manual Muscle Testing (MMT) Assessment BUE WFL for ADLs  -AR       Row Name 08/02/23 1506          Balance    Balance Assessment sitting static balance;sitting dynamic balance;standing static balance;standing dynamic balance  -AR     Static Sitting Balance supervision  -AR     Dynamic Sitting Balance supervision  -AR     Position, Sitting Balance sitting in chair;unsupported  -AR     Static Standing Balance contact guard  -AR     Dynamic Standing Balance contact guard  -AR     Position/Device Used, Standing Balance supported;walker, front-wheeled  -AR               User Key  (r) = Recorded By, (t) = Taken By, (c) = Cosigned By      Initials Name Provider Type    Dasia Mosher OT Occupational Therapist                   Goals/Plan       Row Name 08/02/23 1512          Transfer Goal 1 (OT)    Activity/Assistive Device (Transfer Goal 1, OT) sit-to-stand/stand-to-sit;toilet;walker, rolling  -AR     [place] Level/Cues Needed (Transfer Goal 1, OT) verbal cues  required;contact guard required  -AR     Time Frame (Transfer Goal 1, OT) long term goal (LTG);by discharge  -AR     Progress/Outcome (Transfer Goal 1, OT) goal partially met  -AR       Row Name 08/02/23 1512          Dressing Goal 1 (OT)    Activity/Device (Dressing Goal 1, OT) lower body dressing;sock-aid;reacher  -AR     [place]/Cues Needed (Dressing Goal 1, OT) verbal cues required;contact guard required  -AR     Time Frame (Dressing Goal 1, OT) long term goal (LTG);by discharge  -AR     Progress/Outcome (Dressing Goal 1, OT) goal met  -AR       Row Name 08/02/23 1512          Therapy Assessment/Plan (OT)    Planned Therapy Interventions (OT) adaptive equipment training;BADL retraining;edema control/reduction;functional balance retraining;IADL retraining;occupation/activity based interventions;patient/caregiver education/training;transfer/mobility retraining  -AR               User Key  (r) = Recorded By, (t) = Taken By, (c) = Cosigned By      Initials Name Provider Type    Dasia Mosher, OT Occupational Therapist                   Clinical Impression       Row Name 08/02/23 7201          Pain Assessment    Pretreatment Pain Rating 5/10  -AR     Posttreatment Pain Rating 5/10  -AR     Pain Location - Side/Orientation Left  -AR     Pain Location - --  quad  -AR     Pain Intervention(s) Medication (See MAR);Cold applied;Repositioned;Ambulation/increased activity  -AR       Row Name 08/02/23 1667          Plan of Care Review    Plan of Care Reviewed With patient;parent  -AR     Outcome Evaluation OT educated pt and family on ADL retraining including AE use, incorporation of segundo-dressing technique and transfer training. He completed transfer training wtih CGA, LB dressing with min servando-CGA. Pt is performing below baseline status d/t decreased balance, pain and decreased LLE AROM. Recommend DC home with initial 24/7 assist.  -AR       Row Name 08/02/23 7717          Therapy Assessment/Plan (OT)     Rehab Potential (OT) good, to achieve stated therapy goals  -AR     Criteria for Skilled Therapeutic Interventions Met (OT) yes  -AR     Therapy Frequency (OT) daily  -AR       Row Name 08/02/23 1507          Therapy Plan Review/Discharge Plan (OT)    Anticipated Discharge Disposition (OT) home with 24/7 care  -AR       Row Name 08/02/23 1507          Vital Signs    Pre Patient Position Sitting  -AR     Intra Patient Position Standing  -AR     Post Patient Position Sitting  -AR       Row Name 08/02/23 1507          Positioning and Restraints    Pre-Treatment Position sitting in chair/recliner  -AR     Post Treatment Position chair  -AR     In Chair notified nsg;reclined;call light within reach;encouraged to call for assist;exit alarm on;with family/caregiver;compression device;waffle cushion;legs elevated  -AR               User Key  (r) = Recorded By, (t) = Taken By, (c) = Cosigned By      Initials Name Provider Type    Dasia Mosher, OT Occupational Therapist                   Outcome Measures       Row Name 08/02/23 1512          How much help from another is currently needed...    Putting on and taking off regular lower body clothing? 3  -AR     Bathing (including washing, rinsing, and drying) 3  -AR     Toileting (which includes using toilet bed pan or urinal) 3  -AR     Putting on and taking off regular upper body clothing 3  -AR     Taking care of personal grooming (such as brushing teeth) 3  -AR     Eating meals 3  -AR     AM-PAC 6 Clicks Score (OT) 18  -AR       Row Name 08/02/23 1409          How much help from another person do you currently need...    Turning from your back to your side while in flat bed without using bedrails? 3  -HP     Moving from lying on back to sitting on the side of a flat bed without bedrails? 3  -HP     Moving to and from a bed to a chair (including a wheelchair)? 3  -HP     Standing up from a chair using your arms (e.g., wheelchair, bedside chair)? 3  -HP     Climbing  3-5 steps with a railing? 3  -HP     To walk in hospital room? 3  -HP     AM-PAC 6 Clicks Score (PT) 18  -HP     Highest level of mobility 6 --> Walked 10 steps or more  -       Row Name 08/02/23 1512 08/02/23 1409       Functional Assessment    Outcome Measure Options AM-PAC 6 Clicks Daily Activity (OT)  -AR AM-PAC 6 Clicks Basic Mobility (PT);PADD  -HP              User Key  (r) = Recorded By, (t) = Taken By, (c) = Cosigned By      Initials Name Provider Type    Dasia Mosher OT Occupational Therapist    HP Shelbi Walker, PT Physical Therapist                    Occupational Therapy Education       Title: PT OT SLP Therapies (Done)       Topic: Occupational Therapy (Done)       Point: ADL training (Done)       Description:   Instruct learner(s) on proper safety adaptation and remediation techniques during self care or transfers.   Instruct in proper use of assistive devices.                  Learning Progress Summary             Patient Eager, E,TB,D,H, DU,VU by AR at 8/2/2023 1513   Family Eager, E,TB,D,H, DU,VU by AR at 8/2/2023 1513                         Point: Precautions (Done)       Description:   Instruct learner(s) on prescribed precautions during self-care and functional transfers.                  Learning Progress Summary             Patient Eager, E,TB,D,H, DU,VU by AR at 8/2/2023 1513   Family Eager, E,TB,D,H, DU,VU by AR at 8/2/2023 1513                         Point: Body mechanics (Done)       Description:   Instruct learner(s) on proper positioning and spine alignment during self-care, functional mobility activities and/or exercises.                  Learning Progress Summary             Patient Eager, E,TB,D,H, DU,VU by AR at 8/2/2023 1513   Family Eager, E,TB,D,H, DU,VU by AR at 8/2/2023 1513                                         User Key       Initials Effective Dates Name Provider Type [facility] 07/11/23 -  Dasia Boucher, OT Occupational Therapist OT                   OT Recommendation and Plan  Planned Therapy Interventions (OT): adaptive equipment training, BADL retraining, edema control/reduction, functional balance retraining, IADL retraining, occupation/activity based interventions, patient/caregiver education/training, transfer/mobility retraining  Therapy Frequency (OT): daily  Plan of Care Review  Plan of Care Reviewed With: patient, parent  Outcome Evaluation: OT educated pt and family on ADL retraining including AE use, incorporation of segundo-dressing technique and transfer training. He completed transfer training wtih CGA, LB dressing with min servando-CGA. Pt is performing below baseline status d/t decreased balance, pain and decreased LLE AROM. Recommend DC home with initial 24/7 assist.     Time Calculation:   Evaluation Complexity (OT)  Review Occupational Profile/Medical/Therapy History Complexity: brief/low complexity  Assessment, Occupational Performance/Identification of Deficit Complexity: 1-3 performance deficits  Clinical Decision Making Complexity (OT): problem focused assessment/low complexity  Overall Complexity of Evaluation (OT): low complexity     Time Calculation- OT       Row Name 08/02/23 1513             Time Calculation- OT    OT Start Time 1400  -AR      OT Received On 08/02/23  -AR      OT Goal Re-Cert Due Date 08/12/23  -AR         Timed Charges    18814 - OT Self Care/Mgmt Minutes 11  -AR         Untimed Charges    OT Eval/Re-eval Minutes 60  -AR         Total Minutes    Timed Charges Total Minutes 11  -AR      Untimed Charges Total Minutes 60  -AR       Total Minutes 71  -AR                User Key  (r) = Recorded By, (t) = Taken By, (c) = Cosigned By      Initials Name Provider Type    AR Dasia Boucher OT Occupational Therapist                  Therapy Charges for Today       Code Description Service Date Service Provider Modifiers Qty    [id number]  OT SELF CARE/MGMT/TRAIN EA 15 MIN 8/2/2023 Dasia Boucher OT GO 1     [id number]  OT EVAL LOW COMPLEXITY 4 8/2/2023 Dasia Boucher, OT GO 1                 Dasia Boucher OT  8/2/2023

## 2023-08-02 NOTE — THERAPY EVALUATION
Patient Name: Devang Kelly  : 2000    MRN: [id number]                              Today's Date: 2023       Admit Date: 2023    Visit Dx: No diagnosis found.  Patient Active Problem List   Diagnosis    Congenital dysplasia of left hip    Current moderate episode of major depressive disorder    Mood disorder    Avascular necrosis of bone of left hip     Past Medical History:   Diagnosis Date    Arthritis     Congenital hip dysplasia     LEFT ONLY    Depression 2020    Fracture 2015    right radial    History of blood transfusion     Tanner Loaiza - at birth, denies transfusion reaction    Wears glasses      Past Surgical History:   Procedure Laterality Date    EPIPHYSEAL ARREST      HIP SURGERY Left     at birth    HYPOSPADIAS CORRECTION      KNEE SURGERY Right     growth plate removed from right knee to correct leg length discrepancy    TONSILLECTOMY        General Information       Row Name 23 1402          Physical Therapy Time and Intention    Document Type evaluation  -     Mode of Treatment physical therapy  -       Row Name 23 1402          General Information    Patient Profile Reviewed yes  -HP     Prior Level of Function min assist:;all household mobility;community mobility;gait;transfer;bed mobility;ADL's  -     Existing Precautions/Restrictions fall;hip, anterior  -     Barriers to Rehab none identified  -HP       Row Name 23 1402          Living Environment    People in Home parent(s)  -HP       Row Name 23 1402          Home Main Entrance    Number of Stairs, Main Entrance none  -HP       Row Name 23 1402          Stairs Within Home, Primary    Number of Stairs, Within Home, Primary none  -HP       Row Name 23 1402          Cognition    Orientation Status (Cognition) oriented x 3  -HP       Row Name 23 1402          Safety Issues, Functional Mobility    Safety Issues Affecting Function (Mobility) insight into  deficits/self-awareness;awareness of need for assistance;safety precaution awareness  -     Impairments Affecting Function (Mobility) balance;endurance/activity tolerance;pain;strength;range of motion (ROM)  -               User Key  (r) = Recorded By, (t) = Taken By, (c) = Cosigned By      Initials Name Provider Type     Shelbi Walker PT Physical Therapist                   Mobility       Row Name 08/02/23 1403          Bed Mobility    Bed Mobility supine-sit  -     Supine-Sit [place] (Bed Mobility) minimum assist (75% patient effort)  -     Assistive Device (Bed Mobility) bed rails;head of bed elevated  -     Comment, (Bed Mobility) assist for LLE management secondary to pain and weakness  -       Row Name 08/02/23 1403          Transfers    Comment, (Transfers) VC for handplacement iwth good carryover  -       Row Name 08/02/23 1403          Sit-Stand Transfer    Sit-Stand [place] (Transfers) contact guard;2 person assist  -     Assistive Device (Sit-Stand Transfers) walker, front-wheeled  -       Row Name 08/02/23 1403 08/02/23 1401       Gait/Stairs (Locomotion)    [place] Level (Gait) contact guard;2 person assist  - --    Assistive Device (Gait) walker, front-wheeled  -HP --    Ambulated day of surgery or within 4 hours of PACU discharge yes  -HP yes  -HP    Distance in Feet (Gait) 300  -  -HP    Deviations/Abnormal Patterns (Gait) bilateral deviations;base of support, wide;weight shifting decreased;stride length decreased;gait speed decreased  -HP --    Bilateral Gait Deviations forward flexed posture;heel strike decreased  - --    Comment, (Gait/Stairs) step-through gait pattern with slight hip ABD. Decreased L heel strike noted that improved with VC. VC fpr upright posture, increased weight shifting onto LLE, and decreased reliance on BUE. Good improvement with VC. No knee buckling or LOB noted. Activity limited by fatigue.  - --      Row Name 08/02/23 1403           Mobility    Extremity Weight-bearing Status left lower extremity  -     Left Lower Extremity (Weight-bearing Status) weight-bearing as tolerated (WBAT)  -               User Key  (r) = Recorded By, (t) = Taken By, (c) = Cosigned By      Initials Name Provider Type     Shelbi Walker, PT Physical Therapist                   Obj/Interventions       [facility] Name 08/02/23 1405          Range of Motion Comprehensive    General Range of Motion lower extremity range of motion deficits identified  -     Comment, General Range of Motion L hip AROM limited by pain and weakness  -[facility] Name 08/02/23 1405          Strength Comprehensive (MMT)    General Manual Muscle Testing (MMT) Assessment lower extremity strength deficits identified  -     Comment, General Manual Muscle Testing (MMT) Assessment Pt able to perform B active ankle DF and LAQ  -[facility] Name 08/02/23 1405          Motor Skills    Therapeutic Exercise hip;knee;ankle  -HP       Row Name 08/02/23 1405          Hip (Therapeutic Exercise)    Hip (Therapeutic Exercise) AAROM (active assistive range of motion)  -     Hip AAROM (Therapeutic Exercise) left;aBduction;flexion;3 repetitions  -HP       Row Name 08/02/23 1405          Knee (Therapeutic Exercise)    Knee (Therapeutic Exercise) isometric exercises;strengthening exercise  -     Knee Isometrics (Therapeutic Exercise) right;quad sets;10 repetitions  -     Knee Strengthening (Therapeutic Exercise) right;LAQ (long arc quad);heel slides;3 repetitions  -HP       Row Name 08/02/23 1405          Ankle (Therapeutic Exercise)    Ankle (Therapeutic Exercise) AROM (active range of motion)  -     Ankle AROM (Therapeutic Exercise) bilateral;dorsiflexion;plantarflexion;10 repetitions  -HP       Row Name 08/02/23 1405          Balance    Balance Assessment sitting static balance;sitting dynamic balance;sit to stand dynamic balance;standing static balance;standing dynamic balance  -     Static  Sitting Balance standby assist  -HP     Dynamic Sitting Balance contact guard  -HP     Position, Sitting Balance sitting edge of bed  -     Static Standing Balance contact guard  -HP     Dynamic Standing Balance contact guard  -HP     Position/Device Used, Standing Balance supported;walker, rolling  -HP     Balance Interventions sitting;sit to stand;standing;occupation based/functional task  -       Row Name 08/02/23 1402          Sensory Assessment (Somatosensory)    Sensory Assessment (Somatosensory) LE sensation intact  -               User Key  (r) = Recorded By, (t) = Taken By, (c) = Cosigned By      Initials Name Provider Type     Shelbi Walker, PT Physical Therapist                   Goals/Plan       Row Name 08/02/23 1404          Bed Mobility Goal 1 (PT)    Activity/Assistive Device (Bed Mobility Goal 1, PT) sit to supine/supine to sit  -HP     [place] Level/Cues Needed (Bed Mobility Goal 1, PT) modified independence  -HP     Time Frame (Bed Mobility Goal 1, PT) long term goal (LTG);3 days  -HP     Progress/Outcomes (Bed Mobility Goal 1, PT) goal ongoing  -       Row Name 08/02/23 0172          Transfer Goal 1 (PT)    Activity/Assistive Device (Transfer Goal 1, PT) sit-to-stand/stand-to-sit  -HP     [place] Level/Cues Needed (Transfer Goal 1, PT) modified independence  -HP     Time Frame (Transfer Goal 1, PT) long term goal (LTG);3 days  -HP     Progress/Outcome (Transfer Goal 1, PT) goal ongoing  -       Row Name 08/02/23 5974          Gait Training Goal 1 (PT)    Activity/Assistive Device (Gait Training Goal 1, PT) gait (walking locomotion)  -HP     [place] Level (Gait Training Goal 1, PT) modified independence  -HP     Distance (Gait Training Goal 1, PT) 500  -HP     Time Frame (Gait Training Goal 1, PT) long term goal (LTG);3 days  -HP     Progress/Outcome (Gait Training Goal 1, PT) goal ongoing  -       Row Name 08/02/23 140          Therapy Assessment/Plan (PT)     Planned Therapy Interventions (PT) balance training;bed mobility training;home exercise program;gait training;patient/family education;transfer training;stretching;strengthening;stair training;ROM (range of motion)  -               User Key  (r) = Recorded By, (t) = Taken By, (c) = Cosigned By      Initials Name Provider Type     Shelbi Walker, PT Physical Therapist                   Clinical Impression       Row Name 08/02/23 1406          Pain    Pretreatment Pain Rating 5/10  -HP     Posttreatment Pain Rating 5/10  -     Pain Location - Side/Orientation Right  -     Pain Location generalized  -     Pain Location - hip  -     Pre/Posttreatment Pain Comment RN aware and managing  -     Pain Intervention(s) Repositioned;Ambulation/increased activity;Elevated  -       Row Name 08/02/23 1406          Plan of Care Review    Plan of Care Reviewed With patient;parent  -     Progress no change  -     Outcome Evaluation Pt amb in halls with CGAx2. No knee buckling or LOB noted. VC for upright posture and heel strike. Activity limited by fatigue. HEP and precautions reviewed with pt. Recommend d/c home with assist and OPPT when medically appropriate. ADLs assessed and pt would benefit from OT consult prior to d/c.  -       Row Name 08/02/23 1406          Therapy Assessment/Plan (PT)    Rehab Potential (PT) good, to achieve stated therapy goals  -     Criteria for Skilled Interventions Met (PT) yes;meets criteria;skilled treatment is necessary  -     Therapy Frequency (PT) 2 times/day  -       Row Name 08/02/23 1406          Vital Signs    Pre Systolic BP Rehab --  VSS  -HP     Pre Patient Position Supine  -     Intra Patient Position Standing  -     Post Patient Position Sitting  -       Row Name 08/02/23 1406          Positioning and Restraints    Pre-Treatment Position in bed  -HP     Post Treatment Position chair  -HP     In Chair notified nsg;reclined;sitting;call light within  reach;encouraged to call for assist;exit alarm on;with family/caregiver;legs elevated  -               User Key  (r) = Recorded By, (t) = Taken By, (c) = Cosigned By      Initials Name Provider Type     Shelbi Walker PT Physical Therapist                   Outcome Measures       Row Name 08/02/23 1409          How much help from another person do you currently need...    Turning from your back to your side while in flat bed without using bedrails? 3  -HP     Moving from lying on back to sitting on the side of a flat bed without bedrails? 3  -HP     Moving to and from a bed to a chair (including a wheelchair)? 3  -HP     Standing up from a chair using your arms (e.g., wheelchair, bedside chair)? 3  -HP     Climbing 3-5 steps with a railing? 3  -HP     To walk in hospital room? 3  -HP     AM-PAC 6 Clicks Score (PT) 18  -HP     Highest level of mobility 6 --> Walked 10 steps or more  -       Row Name 08/02/23 1409          PADD    Diagnosis 2  -HP     Gender 2  -HP     Age Group 4  -HP     Gait Distance 1  -HP     Assist Level 1  -HP     Home Support 3  -HP     PADD Score 11  -HP     Patient Preference home with outpatient rehab  -     Prediction by PADD Score directly home (with home health or out-patient rehab)  -       Row Name 08/02/23 1409          Functional Assessment    Outcome Measure Options AM-PAC 6 Clicks Basic Mobility (PT);PADD  -HP               User Key  (r) = Recorded By, (t) = Taken By, (c) = Cosigned By      Initials Name Provider Type     Shelbi Walker PT Physical Therapist                                 Physical Therapy Education       Title: PT OT SLP Therapies (Done)       Topic: Physical Therapy (Done)       Point: Mobility training (Done)       Learning Progress Summary             Patient Acceptance, E,D, VU,DU by  at 8/2/2023 1409   Family Acceptance, E,D, VU,DU by  at 8/2/2023 1409                         Point: Home exercise program (Done)       Learning Progress  Summary             Patient Acceptance, E,D, VU,DU by  at 8/2/2023 1409   Family Acceptance, E,D, VU,DU by  at 8/2/2023 1409                         Point: Body mechanics (Done)       Learning Progress Summary             Patient Acceptance, E,D, VU,DU by  at 8/2/2023 1409   Family Acceptance, E,D, VU,DU by  at 8/2/2023 1409                         Point: Precautions (Done)       Learning Progress Summary             Patient Acceptance, E,D, VU,DU by  at 8/2/2023 1409   Family Acceptance, E,D, VU,DU by  at 8/2/2023 1409                                         User Key       Initials Effective Dates Name Provider Type Discipline     06/01/21 -  Shelbi Walker, PT Physical Therapist PT                  PT Recommendation and Plan  Planned Therapy Interventions (PT): balance training, bed mobility training, home exercise program, gait training, patient/family education, transfer training, stretching, strengthening, stair training, ROM (range of motion)  Plan of Care Reviewed With: patient, parent  Progress: no change  Outcome Evaluation: Pt amb in halls with CGAx2. No knee buckling or LOB noted. VC for upright posture and heel strike. Activity limited by fatigue. HEP and precautions reviewed with pt. Recommend d/c home with assist and OPPT when medically appropriate. ADLs assessed and pt would benefit from OT consult prior to d/c.     Time Calculation:   PT Evaluation Complexity  History, PT Evaluation Complexity: 1-2 personal factors and/or comorbidities  Examination of Body Systems (PT Eval Complexity): total of 3 or more elements  Clinical Presentation (PT Evaluation Complexity): stable  Clinical Decision Making (PT Evaluation Complexity): low complexity  Overall Complexity (PT Evaluation Complexity): low complexity     PT Charges       Row Name 08/02/23 1331             Time Calculation    Start Time 1331  -      PT Received On 08/02/23  -      PT Goal Re-Cert Due Date 08/12/23  -         Timed  Charges    41962 - PT Therapeutic Activity Minutes 10  -HP         Untimed Charges    PT Eval/Re-eval Minutes 40  -HP         Total Minutes    Timed Charges Total Minutes 10  -HP      Untimed Charges Total Minutes 40  -HP       Total Minutes 50  -HP                User Key  (r) = Recorded By, (t) = Taken By, (c) = Cosigned By      Initials Name Provider Type     Shelbi Walker, PT Physical Therapist                  Therapy Charges for Today       Code Description Service Date Service Provider Modifiers Qty    81077188211 HC PT THERAPEUTIC ACT EA 15 MIN 8/2/2023 Shelbi Walker, PT GP 1    25159033738 HC PT EVAL LOW COMPLEXITY 3 8/2/2023 Shelbi Walker, PT GP 1    47234995757 HC PT THER SUPP EA 15 MIN 8/2/2023 Shelbi Walker, PT GP 3            PT G-Codes  Outcome Measure Options: AM-PAC 6 Clicks Basic Mobility (PT), PADD  AM-PAC 6 Clicks Score (PT): 18  PT Discharge Summary  Anticipated Discharge Disposition (PT): home with assist, home with outpatient therapy services    Shelbi Walker PT  8/2/2023

## 2023-08-02 NOTE — PLAN OF CARE
Problem: Adult Inpatient Plan of Care  Goal: Absence of Hospital-Acquired Illness or Injury  Intervention: Identify and Manage Fall Risk  Recent Flowsheet Documentation  Taken 8/2/2023 1220 by Evelyn Osman RN  Safety Promotion/Fall Prevention:   activity supervised   clutter free environment maintained   assistive device/personal items within reach   toileting scheduled   safety round/check completed   room organization consistent   nonskid shoes/slippers when out of bed   gait belt   fall prevention program maintained  Intervention: Prevent and Manage VTE (Venous Thromboembolism) Risk  Recent Flowsheet Documentation  Taken 8/2/2023 1220 by Evelyn Osman RN  Activity Management: activity encouraged  VTE Prevention/Management:   bilateral   sequential compression devices on  Goal: Optimal Comfort and Wellbeing  Intervention: Provide Person-Centered Care  Recent Flowsheet Documentation  Taken 8/2/2023 1220 by Evelyn Osman RN  Trust Relationship/Rapport:   care explained   reassurance provided  Goal: Readiness for Transition of Care  Intervention: Mutually Develop Transition Plan  Recent Flowsheet Documentation  Taken 8/2/2023 1256 by Evelyn Osman RN  Transportation Anticipated: family or friend will provide  Patient/Family Anticipated Services at Transition: none  Patient/Family Anticipates Transition to: home   Goal Outcome Evaluation:

## 2023-08-02 NOTE — PLAN OF CARE
Goal Outcome Evaluation:  Plan of Care Reviewed With: patient, parent        Progress: no change  Outcome Evaluation: Pt amb in halls with CGAx2. No knee buckling or LOB noted. VC for upright posture and heel strike. Activity limited by fatigue. HEP and precautions reviewed with pt. Recommend d/c home with assist and OPPT when medically appropriate. ADLs assessed and pt would benefit from OT consult prior to d/c.      Anticipated Discharge Disposition (PT): home with assist, home with outpatient therapy services

## 2023-08-02 NOTE — OP NOTE
TOTAL HIP ARTHROPLASTY ANTERIOR  Procedure Report    Patient Name:  Devang Kelly  YOB: 2000    Date of Surgery:  8/2/2023     Indications:    23 y.o. male who was admitted to [facility] on a progressive management of nonoperative treatment of their hip osteoarthritis. Unfortunately patient progressed with significant pain and difficulty with ambulation and daily function.  The patient was indicated for a total hip arthroplasty. Likely risk benefits of the procedure including but not limited to infection, DVT, pulmonary embolism, leg length discrepancy, recurrent dislocation, possibility of injury to nerves and vessels, and periprosthetic fractures have been discussed with the patient and her daughter in detail. Despite the risks involved the patient elected to proceed with an informed consent was obtained.     Patient Identification:  Patient was seen in pre-op, consent was reviewed, operative procedure was identified, surgical site and thigh marked.    Complexity Modifier:   Due to the patients severe deformity secondary to avascular necrosis has been chronic with about 6 cm shortening of the proximal femur the surgery required additional surgical dissection, assistance with retraction, and increased surgical exposure in order to perform the total joint replacement. This required additional assistance in the operating room, unique instrumentation and techniques, increased time, increased risk, which all made for a more complex and difficult joint replacement procedure.  Therefore, a Modifier 22 is being utilized.      Pre-op Diagnosis:   Avascular necrosis of bone of left hip [[id number]]       Post-Op Diagnosis Codes:     * Avascular necrosis of bone of left hip [M87.052]    Procedure/CPTr Codes:      Procedure(s):  ANTERIOR TOTAL HIP ARTHROPLASTY - LEFT    Staff:  Surgeon(s):  Tim Lawrence MD    Anesthesia: Spinal    Estimated Blood Loss: 150 mL    Implants:     Implant Name Type Inv. Item Serial No.  Lot No. LRB No. Used Action   DEV CONTRL TISS STRATAFIX SYMM PDS PLUS BISI CT-1 45CM - [id number] Implant DEV CONTRL TISS STRATAFIX SYMM PDS PLUS BISI CT-1 45CM  [employer]  Left 1 Implanted   SUT NONABS MAXBRAID/PE NMBR2 HC5 38IN WHT [id number] - [id number] Implant SUT NONABS MAXBRAID/PE NMBR2 HC5 38IN T [id number]  [employer]  Left 1 Implanted   SUT NONABS MAXBRAID/PE NMBR2 HC5 38IN T 149570 - [id number] Implant SUT NONABS MAXBRAID/PE NMBR2 HC5 38IN T [id number]  [employer]  Left 1 Implanted   SHLL ACET OSSEOTI G7 3H ROSALIE 52MM - [id number] Implant SHLL ACET OSSEOTI G7 3H ROSALIE 52MM  [employer] [id number] Left 1 Implanted   bone screw    Franny [id number] Left 1 Implanted   SCRW S/TAP 6.5X25MM - [id number] Implant SCRW S/TAP 6.5X25MM  [employer] [id number] Left 1 Implanted   Bone screw    Franny [id number] Left 1 Implanted   liner    Franny [id number] Left 1 Implanted   STEM HIP PRSTH/CONE BENÍTEZ 125DEG 12/14TPR 18MM - [id number] Implant STEM HIP PRSTH/CONE BENÍTEZ 125DEG 12/14TPR 18MM  [employer] [id number] Left 1 Implanted   HD FEM/HIP BIOLOX/DELTA CERAM 12/10X97YE PLS0MM - [id number] Implant HD FEM/HIP BIOLOX/DELTA CERAM 12/52Q39FT PLS0MM  [employer] [id number] Left 1 Implanted       Specimen:          None      Findings: Severe collapse of the femoral head with avascular necrosis and shortening    Complications: none    Description of Procedure: The patient was transferred to [facility] operating room, preoperative antibiotics in form of Kefzol 2gm Given IV Prior to skin incision as well as 1 g of tranexamic acid. Patient received Spinalanesthesia and was transferred to the [place] table. All bony prominences were padded adequately. The operative hip was then prepped and draped in the usual sterile fashion. Multiple timeouts were done identifying the correct patient, surgical site, and planned procedure.    A skin incision was made overlying  the anterior lateral aspect of the hip for about 10 cm just below the lateral to the anterior superior iliac spine. Skin and subcutaneous tissue and fascia were incised in line with the skin incision overlying the tensor fascia madalyn muscle. The tensor muscle was then retracted laterally and the interval between sartorius and rectus femoris medially and the tensor fascia muscle was developed. The lateral femoral circumflex vessels were identified and they were ligated without difficulty. The deep fascia was incised and the capsule of the hip joint was identified. We then placed a soft tissue protecting device deep to the rectus and the tensor. Retractors were then placed and extracapsular capsule was then Incised in a T-shaped manner and the anterior posterior capsules were then tied with MaxBraid suture . Following this, retractors were then placed intracapsularly. We did identify the obvious signs of severe osteoarthritis upon incising the capsule. We then made appropriate releases done on the inferior medial neck and along the saddle of the femoral neck. The remaining femoral neck was identified and osteotomy was done according to the preoperative templating with an oscillating saw. The femoral head and cut neck were extracted using a corkscrew without difficulty. The femoral head did have major signs of articular cartilage loss and superior wear.    The acetabular cavity was exposed by placing retractors and taking care to protect the anterior vascular structures. The labrum was excised. The pulvinar was excised from the cotyloid fossa. The acetabular cavity was then progressively reamed maintaining the correct inclination and version under image intensifier control. Adequate medialization was obtained. Adequate fit was found to be obtained with the reamer size of 51. The Biomet G7 OsseoTi cup size 52 was then impacted into position. This was found to seat satisfactorily with adequate inclination and anteversion.  It was stable but we did insert 3,  6.5/35 mm lag screw. This was checked under fluoroscopy and found to be in good position. Following this, the cup was cleaned and then a 32 neutralE-Poly impacted. Following this, the periarticular tissues were then infiltrated with local anesthetic.    Attention was then directed to the proximal femur. The proximal femur was delivered using a trochanteric hook placed just distal to the vastus tubercle and proximal to the gluteus jomar tendon. The leg was then externally rotated and gross traction released and hyperextension and adduction were done using the [place] table. Mechanical lift on the table was utilized to support the femur. Appropriate capsular releases were made to expose the medial slope of the greater trochanter and proximal femur was delivered. The femoral canal was then entered by removing lateral femoral neck with a box chisel by serial reaming. Adequate fit was found to be obtained with the size 18 reamer. We then trialed a std neck was reduced. Fluoroscopic imaging was used to assess leg length and offset was found to be symmetric. The trials were then removed and a #18 simons stem was implanted in appropriate anteversion. It sat very similar to our broach trial. We chose the std neck ceramic. This was then reduced and again fluoroscopic remaining images both lateral and AP of the femur showed the stem to be in good position. AP pelvis showed our goal restoration of leg length at the pelvis as the patient had a previous hip a few Deasis on the right knee. The hip was then taken through a range of motion and found to be stable. There were no acute fractures and the wound was then thoroughly irrigated with saline. We did use more of the injection cocktail. Betadine irrigation was used followed by 3L of saline irrigation. Soft tissue hemostasis was secured and topical TXA was used. Sponge, needle, and instrument counts were correct. MaxBraid sutures directly  anterior and posterior capsular flaps were tied to each other and then closed the fascial layer with a running #2 STRATAFIX followed by 2-0 Vicryl and then Monocryl for the skin and Dermabond with Prineo mesh over the top. Once the Dermabond had dried, sterile dressing was placed over the top. The patient was then transferred to the recovery room in stable condition. Patient tolerated the procedure well. There were no medications. The patient had adequate distal pulses and good cap refill.    The patient will be mobilized by physical therapy. The patient will receive antibiotic prophylaxis postoperatively for 2 more doses given the first 24 hours. The patient was started on DVT prophylaxis with 81 mg aspirin twice daily on starting postoperative day #1.    I discussed the satisfactory performance of the procedure with the patient's family and discussed the postoperative management.      Assistant Participation:  Surgeon(s):  Tim Lawrence MD    Assistant: Jason Philip PA-C assisted with proper preoperative positioning, preoperative templating, determingin availability of proper implants, prepping and draping of patient, manipulation placement of instruments, protection of ligaments and vital soft tissue structures, assistance in maintaining hemostasis and assistance with closure of the wound. Their skills and knowledge of the steps of operation and the desired outcome of each surgical step was crucial, allowing for efficient choreography of surgical procedure, and closure of the wound which lead to reduced surgical time, less blood loss, and less risk of complications for the patient.       Tim Lawrence MD     Date: 8/2/2023  Time: 10:47 EDT

## 2023-08-02 NOTE — CASE MANAGEMENT/SOCIAL WORK
Continued Stay Note  [facility]     Patient Name: Devang Kelly  MRN: [id number]  Today's Date: 8/2/2023    Admit Date: 8/2/2023    Plan: Home with mother's assist and OP PT at [address] in [place]   Discharge Plan       Row Name 08/02/23 1618       Plan    Plan Home with mother's assist and OP PT at [address]    Patient/Family in Agreement with Plan yes    Plan Comments Same Day Surgery. Home with mother's assistance. Gaby has a rolling walker in the home. Dr Lawrence's office set up OP PT with [address] in [place]. They will be in touch with patient.    Final Discharge Disposition Code 01 - home or self-care                   Discharge Codes    No documentation.                 Expected Discharge Date and Time       Expected Discharge Date Expected Discharge Time    Aug 2, 2023               Lucía Hidalog RN

## 2023-08-02 NOTE — H&P
Patient Care Team:      Chief complaint: Left hip pain    Subjective:  Patient is a 23 y.o.male presents with a life long history of intermittent left hip pain.  He states there was a procedure done on his left hip soon after birth but unsure what was done.  He has now been diagnosed with left hip dysplasia.  He denies any recent changes in his general health.  Here today for left total hip arthroplasty.    Review of Systems:  General ROS: negative  Cardiovascular ROS: no chest pain or dyspnea on exertion  Respiratory ROS: no cough, shortness of breath, or wheezing      Allergies:   Allergies   Allergen Reactions    Cephalosporins Rash    Chlorhexidine Gluconate Rash    Penicillins Rash          Latex: no  Contrast Dye: no    Home Meds    No medications prior to admission.     PMH:   Past Medical History:   Diagnosis Date    Arthritis     Congenital hip dysplasia     LEFT ONLY    Depression 2020    Fracture 2015    right radial    History of blood transfusion 2002    [facility] [place] - at birth, denies transfusion reaction    Wears glasses      PSH:    Past Surgical History:   Procedure Laterality Date    EPIPHYSEAL ARREST      HIP SURGERY Left     at birth    HYPOSPADIAS CORRECTION      KNEE SURGERY Right     growth plate removed from right knee to correct leg length discrepancy    TONSILLECTOMY       Immunization History: pneumo: no   Flu: no  Tetanus: no  Social History:   Tobacco: yes   Alcohol: yes      Physical Exam:There were no vitals taken for this visit.      General Appearance:    Alert, cooperative, no distress, appears stated age   Head:    Normocephalic, without obvious abnormality, atraumatic   Lungs:     Clear to auscultation bilaterally, respirations unlabored    Heart: Regular rate and rhythm, S1 and S2 normal, no murmur, rub    or gallop    Abdomen:    Soft without tenderness   Breast Exam:    deferred   Genitalia:    deferred   Extremities:   Extremities normal, atraumatic, no cyanosis  or edema   Skin:   Skin color, texture, turgor normal, no rashes or lesions   Neurologic:   Grossly intact     Results Review: Chem profile, CBC, PT/INR, CRP , EKG on chart    Impression: Left hip congenital dysplasia    Plan: For left total hip arthroplasty today  ALIREZA Morales 8/2/2023 06:46 EDT

## 2023-08-02 NOTE — H&P
Patient Name: Devang Kelly  MRN: [id number]  : 2000  DOS: 2023    Attending: Tim Lawrence MD    Primary Care Provider: Artur Hudson MD      Chief complaint: Left hip pain    Subjective   Patient is a pleasant 23 y.o. male presented for scheduled surgery by Dr. Lawrence.  He underwent left total hip arthroplasty under spinal anesthesia.  He tolerated surgery well and was admitted for further medical management. He has had left hip pain his whole life.  He had congenital hip dysplasia.  He denies use of assistive device for ambulation.    When seen postop he is doing well.  His pain is well controlled.  He denies nausea, shortness of breath or chest pain.  No history of DVT or PE.    Allergies:  Allergies   Allergen Reactions    Cephalosporins Rash    Chlorhexidine Gluconate Rash    Penicillins Rash       Meds:  No medications prior to admission.         History:   Past Medical History:   Diagnosis Date    Arthritis     Congenital hip dysplasia     LEFT ONLY    Depression 2020    Fracture 2015    right radial    History of blood transfusion     [facility] - [facility], denies transfusion reaction    Wears glasses      Past Surgical History:   Procedure Laterality Date    EPIPHYSEAL ARREST      HIP SURGERY Left     at birth    HYPOSPADIAS CORRECTION      KNEE SURGERY Right     growth plate removed from right knee to correct leg length discrepancy    TONSILLECTOMY       Family History   Problem Relation Age of Onset    Thyroid disease Mother     Cancer Maternal Grandmother     Diabetes Maternal Grandfather      Social History     Tobacco Use    Smoking status: Former     Packs/day: 0.25     Years: 4.00     Pack years: 1.00     Types: Cigarettes     Quit date: 2023     Years since quittin.2    Smokeless tobacco: Current     Types: Snuff    Tobacco comments:     23 - has been using nicotine pouches to assist with quitting smoking, will be cutting nicotine amount  "down prior to surgery to zero.   Vaping Use    Vaping Use: Never used   Substance Use Topics    Alcohol use: Yes     Alcohol/week: 8.0 standard drinks     Types: 8 Cans of beer per week     Comment: Social drinker    Drug use: Not Currently     Types: Marijuana     Comment: quit marijuana summer, 2022   He lives with his parents.  He has no children.  He does not work.    Review of Systems  Pertinent items are noted in HPI, all other systems reviewed and negative    Vital Signs  BP 99/51 (BP Location: Right arm, Patient Position: Sitting)   Pulse 63   Temp 97.8 øF (36.6 øC) (Oral)   Resp 16   Ht 175.3 cm (69\")   Wt 77.6 kg (171 lb)   SpO2 100%   BMI 25.25 kg/mý     Physical Exam:    General Appearance:    Alert, cooperative, in no acute distress   Head:    Normocephalic, without obvious abnormality, atraumatic   Eyes:            Lids and lashes normal, conjunctivae and sclerae normal, no   icterus, no pallor, corneas clear,    Ears:    Ears appear intact with no abnormalities noted   Throat:   No oral lesions, no thrush, oral mucosa moist   Neck:   No adenopathy, supple, trachea midline, no thyromegaly    Lungs:     Clear to auscultation,respirations regular, even and unlabored    Heart:    Regular rhythm and normal rate, normal S1 and S2, no murmur, no gallop   Abdomen:     Normal bowel sounds, no masses, no organomegaly, soft non-tender, non-distended, no guarding, no rebound  tenderness   Genitalia:    Deferred   Extremities: Left hip OPTi foam CDI   Pulses:   Pulses palpable and equal bilaterally   Skin:   No bleeding, bruising or rash   Neurologic:   Cranial nerves 2 - 12 grossly intact. Flexion and dorsiflexion intact bilateral feet.        I reviewed the patient's new clinical results.     Lab Results   Component Value Date    HGBA1C 5.00 07/21/2023      Latest Reference Range & Units 07/21/23 11:05   Sodium 136 - 145 mmol/L 139   Potassium 3.5 - 5.2 mmol/L 3.9   Chloride 98 - 107 mmol/L 100   CO2 22.0 " - 29.0 mmol/L 26.0   Anion Gap 5.0 - 15.0 mmol/L 13.0   BUN 6 - 20 mg/dL 12   Creatinine 0.76 - 1.27 mg/dL 0.97   BUN/Creatinine Ratio 7.0 - 25.0  12.4   eGFR >60.0 mL/min/1.73 113.9   Glucose 65 - 99 mg/dL 103 (H)   Calcium 8.6 - 10.5 mg/dL 9.8   Alkaline Phosphatase 39 - 117 U/L 74   Total Protein 6.0 - 8.5 g/dL 7.8   Albumin 3.5 - 5.2 g/dL 5.0   Globulin gm/dL 2.8   A/G Ratio g/dL 1.8   AST (SGOT) 1 - 40 U/L 25   ALT (SGPT) 1 - 41 U/L 46 (H)   Total Bilirubin 0.0 - 1.2 mg/dL 0.4   Fructosamine 0 - 285 umol/L 215   Hemoglobin A1C 4.80 - 5.60 % 5.00   C-Reactive Protein 0.00 - 0.50 mg/dL 0.73 (H)   25 Hydroxy, Vitamin D 30.0 - 100.0 ng/ml 36.0   Protime 12.2 - 14.5 Seconds 12.5   INR 0.89 - 1.12  0.92   PTT 22.0 - 39.0 seconds 25.7   WBC 3.40 - 10.80 10*3/mm3 6.18   RBC 4.14 - 5.80 10*6/mm3 5.34   Hemoglobin 13.0 - 17.7 g/dL 15.7   Hematocrit 37.5 - 51.0 % 47.0   Platelets 140 - 450 10*3/mm3 275   RDW 12.3 - 15.4 % 12.9   MCV 79.0 - 97.0 fL 88.0   MCH 26.6 - 33.0 pg 29.4   MCHC 31.5 - 35.7 g/dL 33.4   MPV 6.0 - 12.0 fL 10.1   (H): Data is abnormally high    Assessment and Plan:     Status post total replacement of left hip    Avascular necrosis of bone of left hip    Congenital dysplasia of left hip      Plan  1. PT/OT- WBAT LLE  2. Pain control-prns   3. IS-encourage  4. DVT proph- Mechs/ASA  5. Bowel regimen  6. Resume home medications as appropriate  7. Monitor post-op labs  8. DC planning for home, likely this evening      VENESSA Rolon  08/02/23  15:40 EDT

## 2023-08-02 NOTE — DISCHARGE INSTRUCTIONS
[facility] Primary Hip and Knee:  You have a sterile dressing in place. Only exchange the dressing if it becomes saturated (fluid draining out the sides of dressing) and notify the office. The dressing is water resistant. During the first 14 days after surgery, it is ok to shower. DO NOT scrub on or around the dressing. Do not submerge the dressing.  After 14 days, you can remove your dressing. Your sutures are all underneath your skin. There is a layer of glue over the incision. DO NOT pick at this or try to peal it off. If the edges do peel up it is ok to trim them as needed. It is OK to shower with the incision exposed. DO NOT scrub on or around the incision. DO NOT submerge the incision in pools, baths, or hot tubs for 1 month after surgery.  No creams or ointments to the incision for 1 month after surgery. After 1 month, it is recommended to massage the scar with vitamin E cream to help decrease scar formation.  Check incision every day and notify surgeon immediately if any of the following signs or symptoms are noted:  Increase in redness  Increase in swelling around the incision and of the entire extremity  Increase in pain  Drainage oozing from the incision  Pulling apart of the edges of the incision  Increase in overall body temperature (greater than 100.5 degrees)    Anticoagulants: You will be discharged on an anticoagulant. This is a prophylactic medication that helps prevent blood clots during your post-operative period. Most patients will be on ***Aspirin 81 mg Enteric coated every 12 hours orally for 30 days. Some patients, due to increased risk factors, will need to be on a stronger anticoagulant. Dr. Lawrence will discuss this need with you.   While taking the anticoagulant, you should avoid taking any additional aspirin, and limit ibuprofen (Advil or Motrin), Aleve (Naprosyn) or other non-steroidal anti-inflammatory medications.   Notify your surgeon immediately if any hannah bleeding is  noted in the urine, stool, vomit, or from the nose or the incision. Blood in the stool will often appear as black rather than red. Blood in urine may appear as pink. Blood in vomit may appear as brown/black like coffee grounds.  You will need to apply pressure for longer periods of time to any cuts or abrasions to stop bleeding  Avoid alcohol while taking anticoagulants  Sequential Compression Device: You maybe be discharged home with a compression device that helps promote blood flow and prevent clots in your legs. Wear these at all times for the first two weeks.   Mobilization: The best way to avoid a blood clot is to get up and walk. 10 times a day get up and walk for 5 minutes for the first two weeks. Walking for longer periods of time will increase pain and swelling, making therapy more difficult. If taking any long travel (car or plane) in the first 1-2 months, be sure to get up and walk at least every one hour.     Stool Softeners: You will be at greater risk of constipation after surgery because of being less mobile and taking the pain medications.   Take stool softeners as instructed by your surgeon while on pain medications. Use over the counter Colace 100 mg 1-2 capsules twice daily.   If stools become too loose or too frequent, decreases the dosage or stop the stool softener.  If constipation occurs despite use of stool softeners, you are to continue the stool softeners and add a laxative (Milk of Magnesia 1 ounce daily as needed).  Dulcolax oral tabs or suppository or a fleets enema can also be utilized for constipation and can be obtained over the counter.   If above interventions are unsuccessful in inducing bowel movements, please contact your family physician's office/surgeon's office.  Drink plenty of fluids and eat fruits and vegetables during your recovery time    Pain Medications utilized after surgery are narcotics and the law requires that the following information be given to all patients  that are prescribed narcotics:  CLASSIFICATION: Pain medications are called Opioids and are narcotics  LEGALITIES: It is illegal to share narcotics with others and to drive within 24 hours of taking narcotics  POTENTIAL SIDE EFFECTS: Potential side effects of opioids include: nausea, vomiting, itching, dizziness, drowsiness, dry mouth, constipation, and difficulty urinating.  POTENTIAL ADVERSE EFFECTS:   Opioid tolerance can develop with use of pain medications and this simply means that it requires more and more of the medication to control pain; however, this is seen more in patients that use Opioids for longer periods of time.  Opioid dependence can develop with use of Opioids and this simply means that to stop the medication can cause withdrawal symptoms; however, this is seen with patients that use Opioids for longer periods of time.  Opioid addiction can develop with use of Opioids and the incidence of this is very unlikely in patients who take the medications as ordered and stop the medications as instructed.  Opioid overdose can be dangerous, but is unlikely when the medication is taken as ordered and stopped when ordered. It is important not to mix opioids with alcohol or with any type of sedative, such as Benadryl, as this can lead to over sedation and respiratory difficulty.  DOSAGE:   Pain medications may be needed consistently for the first week to decrease pain and promote adequate pain relief and participation in physical therapy.  After the initial surgical pain begins to resolve, you may begin to decrease the pain medication and only take it as needed. By the end of 6 weeks, you should be off of pain medications.  You can decrease your pain medication consumption by slowly spacing out the time in between the medication, and using 650mg Tylenol when the pain is not as severe. Do not exceed 3500mg of Tylenol in 24 hours.   Refills will not be given by the office during evening hours, on weekends, or  "after 6 weeks post-op.  To seek refills on pain medications during the initial 6 week post-operative period, you must call the office 48 hours in advance to request the refill. The office will then notify you when to  the prescription. DO NOT wait until you are out of the medication to request a refill."I received and reviewed a copy of the BHLEX Joint Replacement Guide, understand the individualized plan of care and self-management training program developed and do not have any questions." SMI COLD THERAPY - PATIENT INSTRUCTION SHEET    Cold Compression Therapy for your comfort and rehabilitation  Your caregivers want you to be productive in your rehab and comfortable during your stay. In keeping with those goals, you will be receiving an SMI Cold Therapy Wrap to help ease post-operative pain and swelling that might keep you from getting back on track! Your SMI Cold Therapy Wrap is effective and simple-to-use, and you will be encouraged to apply it throughout your hospital stay and at home through the duration of your recovery.    When you are ready to go home  Be sure to take your SMI Cold Therapy Wrap and both sets of Gel Bags with you for continued comfort and use throughout your rehabilitation. If you don't already have them, ask your nurse or aide to retrieve your SMI Gel Bags from the patient freezer.    Home use precautions  Always follow your medical professional's application instructions upon discharge. Your SMI Cold Therapy Wrap and Gel Bags are designed to last for months following your surgery. Never heat the Gel Bags unless specified by your healthcare provider. Supervision is advised when using this product on children or geriatric patients. To avoid danger of suffocation, please keep the outer plastic packaging away from children & pets.    Cold Therapy Instructions  Place Gel Bags in a freezer set _ of the way to max temperature for at least (4) hours. For best results, lay the Gel Bags flat " and [id number] in the freezer. Once frozen, slide Gel Bags into the gel pouch and secure your wrap to the affected area with the straps.  Gel wraps that have been stored in a freezer for an extended period of time may require a (10) minute period of softening up in a room temperature environment before application.  The gel pouch acts as a protective barrier. NEVER place frozen bags directly onto skin, as this may cause frostbite injury.  The [facility] Cold Therapy Wrap is designed to be able to be worm while ambulating. The compression straps can be secured well enough so that the Wrap won't fall off while moving.  Wrap Application Videos can be viewed at [email or web address].  An additional protective barrier such as clothing, a washcloth, hand-towel or pillowcase may be used during prolonged treatment applications.  The Gel-Pouch and Wrap are both Latex-Free and the Gel Bag ingredients are non toxic.    SMI Wrap care instructions  The [facility] Cold Therapy Wrap may be hand washed and hung to dry when needed.    [facility] re-order information  Additional [facility] body specific wraps and/or Gel Bags can be re-ordered from [email or web address] or call [employer] ([phone])

## 2023-09-20 ENCOUNTER — APPOINTMENT (OUTPATIENT)
Dept: CT IMAGING | Facility: HOSPITAL | Age: 23
End: 2023-09-20
Payer: COMMERCIAL

## 2023-09-20 ENCOUNTER — HOSPITAL ENCOUNTER (EMERGENCY)
Facility: HOSPITAL | Age: 23
Discharge: HOME | End: 2023-09-20
Attending: EMERGENCY MEDICINE
Payer: COMMERCIAL

## 2023-09-20 VITALS
OXYGEN SATURATION: 97 % | BODY MASS INDEX: 24.88 KG/M2 | SYSTOLIC BLOOD PRESSURE: 136 MMHG | WEIGHT: 168 LBS | HEART RATE: 106 BPM | HEIGHT: 69 IN | DIASTOLIC BLOOD PRESSURE: 72 MMHG | RESPIRATION RATE: 18 BRPM | TEMPERATURE: 98.7 F
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
## 2023-09-20 DIAGNOSIS — S01.85XA DOG BITE OF FACE, INITIAL ENCOUNTER: Primary | ICD-10-CM

## 2023-09-20 PROCEDURE — 25510000001 IOPAMIDOL 61 % SOLUTION: Performed by: EMERGENCY MEDICINE

## 2023-09-20 PROCEDURE — 70487 CT MAXILLOFACIAL W/DYE: CPT

## 2023-09-20 PROCEDURE — 99285 EMERGENCY DEPT VISIT HI MDM: CPT

## 2023-09-20 RX ORDER — CLINDAMYCIN HYDROCHLORIDE 300 MG/1
300 CAPSULE ORAL 3 TIMES DAILY
Qty: 21 CAPSULE | Refills: 0 | Status: SHIPPED | OUTPATIENT
Start: 2023-09-20 | End: 2023-09-27

## 2023-09-20 RX ORDER — SODIUM CHLORIDE 0.9 % (FLUSH) 0.9 %
10 SYRINGE (ML) INJECTION AS NEEDED
Status: DISCONTINUED | OUTPATIENT
Start: 2023-09-20 | End: 2023-09-20 | Stop reason: HOSPADM

## 2023-09-20 RX ADMIN — IOPAMIDOL 100 ML: 612 INJECTION, SOLUTION INTRAVENOUS at 16:39

## 2023-09-20 NOTE — ED PROVIDER NOTES
Subjective   History of Present Illness  Chief Complaint: Dog bite  History of Present Illness: This is a 23-year-old male presents today with possible infection around a dog bite that was sustained yesterday, it was his friend's dog who is currently being quarantine, went to [facility] where they glued the small laceration the nasolabial fold on the right, patient states it started draining and started swelling so he peeled the glue off.  They did discharge him with Bactrim which she has been taking.  Patient is allergic to oxacillin and cephalosporins which is why he was discharged with Bactrim.      Nurses Notes reviewed and agree, including vitals, allergies, social history and prior medical history.    REVIEW OF SYSTEMS: All systems reviewed and not pertinent unless noted.    Positive for: Dog bite    Negative for: Other review of systems reviewed negative unless specified  Review of Systems    Past Medical History:   Diagnosis Date    Arthritis     Congenital hip dysplasia     LEFT ONLY    Depression 2020    Fracture 2015    right radial    History of blood transfusion 2002    [facility] - at birth, denies transfusion reaction    Wears glasses        Allergies   Allergen Reactions    Cephalosporins Rash    Chlorhexidine Gluconate Rash    Penicillins Rash       Past Surgical History:   Procedure Laterality Date    EPIPHYSEAL ARREST      HIP SURGERY Left     at birth    HYPOSPADIAS CORRECTION      KNEE SURGERY Right     growth plate removed from right knee to correct leg length discrepancy    TONSILLECTOMY      TOTAL HIP ARTHROPLASTY Left 8/2/2023    Procedure: ANTERIOR TOTAL HIP ARTHROPLASTY - LEFT;  Surgeon: Tim Lawrence MD;  Location: [facility];  Service: Orthopedics;  Laterality: Left;       Family History   Problem Relation Age of Onset    Thyroid disease Mother     Cancer Maternal Grandmother     Diabetes Maternal Grandfather        Social History     Socioeconomic History    Marital  status: Single   Tobacco Use    Smoking status: Former     Packs/day: 0.25     Years: 4.00     Pack years: 1.00     Types: Cigarettes     Quit date: 2023     Years since quittin.3    Smokeless tobacco: Current     Types: Snuff    Tobacco comments:     23 - has been using nicotine pouches to assist with quitting smoking, will be cutting nicotine amount down prior to surgery to zero.   Vaping Use    Vaping Use: Never used   Substance and Sexual Activity    Alcohol use: Yes     Alcohol/week: 8.0 standard drinks     Types: 8 Cans of beer per week     Comment: Social drinker    Drug use: Not Currently     Types: Marijuana     Comment: quit marijuana summer, 2022    Sexual activity: Defer           Objective   Physical Exam  Constitutional:       Appearance: Normal appearance. He is normal weight.   HENT:      Head:      Comments: Patient has a very small 1 to 2 cm laceration around the right nasolabial fold, there is some mild purulent drainage, also has some periorbital swelling to the lower eye, but no orbital involvement, no pain with EOMs.  Cardiovascular:      Rate and Rhythm: Normal rate and regular rhythm.      Pulses: Normal pulses.      Heart sounds: Normal heart sounds.   Pulmonary:      Effort: Pulmonary effort is normal.      Breath sounds: Normal breath sounds.   Musculoskeletal:         General: Normal range of motion.      Cervical back: Neck supple.   Skin:     Capillary Refill: Capillary refill takes less than 2 seconds.      Comments: See HEENT exam for skin findings otherwise skin is unremarkable   Neurological:      General: No focal deficit present.      Mental Status: He is alert and oriented to person, place, and time. Mental status is at baseline.   Psychiatric:         Mood and Affect: Mood normal.         Behavior: Behavior normal.         Thought Content: Thought content normal.         Judgment: Judgment normal.       Procedures           ED Course                                            Medical Decision Making  Initial impression of presenting illness: 23-year-old male currently on Bactrim for a dog bite sustained yesterday to the right nasolabial fold presents today with complaints of possible infection around the area and swelling to the, was seen at [facility] last night where they glued the area, but he has since peeled the glue off.    DDX includes but is not limited to skin infection    Patient arrives hemodynamically stable, afebrile without respiratory distress with vitals interpreted by me. Pertinent features from physical exam: Does have some purulent drainage around the laceration to the right nasolabial fold with some mild periorbital swelling to the lower eyelid, no palpable areas of abscess noted..    Initial diagnostic plan: Obtain CT scan of the face with contrast to further evaluate the extent of the infection    Results from initial plan were reviewed and interpreted by me revealing CT scan does show mild cellulitis overlying the dog bite otherwise no abnormalities  Diagnostic information from other sources: None    Interventions / Re-evaluation: None    Results/clinical rationale were discussed with the patient and his mother    Consultations/Discussion of results with other physicians: None    Disposition plan: We will add on clindamycin as Bactrim is not sufficient monotherapy for an animal bite, made aware to continue taking the Bactrim.  Will allow to drain as it has now been 24 hours since the accident.  Advised clean with soap and water and cover with a bandage.    Amount and/or Complexity of Data Reviewed  Radiology: ordered.    Risk  Prescription drug management.        Final diagnoses:   Dog bite of face, initial encounter       ED Disposition  ED Disposition       ED Disposition   Discharge    Condition   Stable    Comment   --               Artur Hudson MD  [address] [place] [address] [phone]    Go to   For wound  re-check    [facility] EMERGENCY DEPARTMENT  [address] [facility] [address] [address] [phone]  Go to   As needed, If symptoms worsen         Medication List        New Prescriptions      clindamycin 300 MG capsule  Commonly known as: CLEOCIN  Take 1 capsule by mouth 3 (Three) Times a Day for 7 days.               Where to Get Your Medications        These medications were sent to [id number] - [place], [place] - [address] AT [address] - [phone]  - [phone]   [address], [place] [address]      Phone: [phone]   clindamycin 300 MG capsule            Jason Tejeda PA  09/20/23 [id number]

## 2024-01-03 ENCOUNTER — OFFICE VISIT (OUTPATIENT)
Dept: INTERNAL MEDICINE | Facility: CLINIC | Age: 24
End: 2024-01-03
Payer: COMMERCIAL

## 2024-01-03 VITALS
HEART RATE: 96 BPM | TEMPERATURE: 96.6 F | RESPIRATION RATE: 16 BRPM | WEIGHT: 181 LBS | OXYGEN SATURATION: 98 % | DIASTOLIC BLOOD PRESSURE: 82 MMHG | SYSTOLIC BLOOD PRESSURE: 122 MMHG | HEIGHT: 69 IN | BODY MASS INDEX: 26.81 KG/M2

## 2024-01-03 DIAGNOSIS — G47.33 OSA (OBSTRUCTIVE SLEEP APNEA): ICD-10-CM

## 2024-01-03 DIAGNOSIS — R53.83 OTHER FATIGUE: Primary | ICD-10-CM

## 2024-01-03 DIAGNOSIS — R74.8 ELEVATED LIVER ENZYMES: ICD-10-CM

## 2024-01-03 PROCEDURE — 99214 OFFICE O/P EST MOD 30 MIN: CPT | Performed by: INTERNAL MEDICINE

## 2024-01-03 NOTE — PROGRESS NOTES
"Subjective     Patient ID: Devang Kelly is a 23 y.o. male. Patient is here for management of multiple medical problems.     Chief Complaint   Patient presents with    Anxiety   ED  History of Present Illness   Complaint of ED. In a new relation ship.   ED.     Tired all the time.   Hard to get enough sleep. If gets enough feels worse. If sleeps enough. Not good quality of sleep.        The following portions of the patient's history were reviewed and updated as appropriate: allergies, current medications, past family history, past medical history, past social history, past surgical history and problem list.    Review of Systems  No current outpatient medications on file.    Objective      Blood pressure 122/82, pulse 96, temperature 96.6 °F (35.9 °C), resp. rate 16, height 175.3 cm (69\"), weight 82.1 kg (181 lb), SpO2 98%.            Physical Exam     General Appearance:    Alert, cooperative, no distress, appears stated age   Head:    Normocephalic, without obvious abnormality, atraumatic   Eyes:    PERRL, conjunctiva/corneas clear, EOM's intact   Ears:    Normal TM's and external ear canals, both ears   Nose:   Nares normal, septum midline, mucosa normal, no drainage   or sinus tenderness   Throat:   Lips, mucosa, and tongue normal; teeth and gums normal   Neck:   Supple, symmetrical, trachea midline, no adenopathy;        thyroid:  No enlargement/tenderness/nodules; no carotid    bruit or JVD   Back:     Symmetric, no curvature, ROM normal, no CVA tenderness   Lungs:     Clear to auscultation bilaterally, respirations unlabored   Chest wall:    No tenderness or deformity   Heart:    Regular rate and rhythm, S1 and S2 normal, no murmur,        rub or gallop   Abdomen:     Soft, non-tender, bowel sounds active all four quadrants,     no masses, no organomegaly   Extremities:   Extremities normal, atraumatic, no cyanosis or edema   Pulses:   2+ and symmetric all extremities   Skin:   Skin color, texture, " turgor normal, no rashes or lesions   Lymph nodes:   Cervical, supraclavicular, and axillary nodes normal   Neurologic:   CNII-XII intact. Normal strength, sensation and reflexes       throughout      Results for orders placed or performed in visit on 07/21/23   MRSA Screen Culture (Outpatient) - Swab, Nares    Specimen: Nares; Swab   Result Value Ref Range    MRSA Screen Cx       No Methicillin Resistant Staphylococcus aureus Isolated at 24 hours   Comprehensive Metabolic Panel    Specimen: Blood   Result Value Ref Range    Glucose 103 (H) 65 - 99 mg/dL    BUN 12 6 - 20 mg/dL    Creatinine 0.97 0.76 - 1.27 mg/dL    Sodium 139 136 - 145 mmol/L    Potassium 3.9 3.5 - 5.2 mmol/L    Chloride 100 98 - 107 mmol/L    CO2 26.0 22.0 - 29.0 mmol/L    Calcium 9.8 8.6 - 10.5 mg/dL    Total Protein 7.8 6.0 - 8.5 g/dL    Albumin 5.0 3.5 - 5.2 g/dL    ALT (SGPT) 46 (H) 1 - 41 U/L    AST (SGOT) 25 1 - 40 U/L    Alkaline Phosphatase 74 39 - 117 U/L    Total Bilirubin 0.4 0.0 - 1.2 mg/dL    Globulin 2.8 gm/dL    A/G Ratio 1.8 g/dL    BUN/Creatinine Ratio 12.4 7.0 - 25.0    Anion Gap 13.0 5.0 - 15.0 mmol/L    eGFR 113.9 >60.0 mL/min/1.73   Protime-INR    Specimen: Blood   Result Value Ref Range    Protime 12.5 12.2 - 14.5 Seconds    INR 0.92 0.89 - 1.12   aPTT    Specimen: Blood   Result Value Ref Range    PTT 25.7 22.0 - 39.0 seconds   Hemoglobin A1c    Specimen: Blood   Result Value Ref Range    Hemoglobin A1C 5.00 4.80 - 5.60 %   Sedimentation Rate    Specimen: Blood   Result Value Ref Range    Sed Rate 3 0 - 15 mm/hr   C-Reactive Protein    Specimen: Blood   Result Value Ref Range    C-Reactive Protein 0.73 (H) 0.00 - 0.50 mg/dL   Vitamin D 25 Hydroxy    Specimen: Blood   Result Value Ref Range    25 Hydroxy, Vitamin D 36.0 30.0 - 100.0 ng/ml   Fructosamine    Specimen: Blood   Result Value Ref Range    Fructosamine 215 0 - 285 umol/L   Nicotine & Metabolite, Quant    Specimen: Blood   Result Value Ref Range    Nicotine 22.7  ng/mL    Cotinine 184.1 ng/mL   CBC Auto Differential    Specimen: Blood   Result Value Ref Range    WBC 6.18 3.40 - 10.80 10*3/mm3    RBC 5.34 4.14 - 5.80 10*6/mm3    Hemoglobin 15.7 13.0 - 17.7 g/dL    Hematocrit 47.0 37.5 - 51.0 %    MCV 88.0 79.0 - 97.0 fL    MCH 29.4 26.6 - 33.0 pg    MCHC 33.4 31.5 - 35.7 g/dL    RDW 12.9 12.3 - 15.4 %    RDW-SD 41.6 37.0 - 54.0 fl    MPV 10.1 6.0 - 12.0 fL    Platelets 275 140 - 450 10*3/mm3    Neutrophil % 53.4 42.7 - 76.0 %    Lymphocyte % 31.7 19.6 - 45.3 %    Monocyte % 11.5 5.0 - 12.0 %    Eosinophil % 2.6 0.3 - 6.2 %    Basophil % 0.6 0.0 - 1.5 %    Immature Grans % 0.2 0.0 - 0.5 %    Neutrophils, Absolute 3.30 1.70 - 7.00 10*3/mm3    Lymphocytes, Absolute 1.96 0.70 - 3.10 10*3/mm3    Monocytes, Absolute 0.71 0.10 - 0.90 10*3/mm3    Eosinophils, Absolute 0.16 0.00 - 0.40 10*3/mm3    Basophils, Absolute 0.04 0.00 - 0.20 10*3/mm3    Immature Grans, Absolute 0.01 0.00 - 0.05 10*3/mm3    nRBC 0.0 0.0 - 0.2 /100 WBC   ECG 12 Lead   Result Value Ref Range    QT Interval 380 ms    QTC Interval 404 ms         Assessment & Plan     Stanley symptoms.   Still needs ss done. Never did last study.      Pt got some labs done. Elevated alt.  Was on IBU for hip pain.  Will recheck labs.        Diagnoses and all orders for this visit:    1. Other fatigue (Primary)    2. STANLEY (obstructive sleep apnea)  -     Home Sleep Study    3. Elevated liver enzymes      Return in about 3 months (around 4/3/2024).          There are no Patient Instructions on file for this visit.     Artur Hudson MD    Assessment & Plan       Answers submitted by the patient for this visit:  Primary Reason for Visit (Submitted on 1/2/2024)  What is the primary reason for your visit?: Other  Other (Submitted on 1/2/2024)  Please describe your symptoms.: depression have history of, anxiety and other issues possibly related  Have you had these symptoms before?: Yes  How long have you been having these symptoms?: Greater  than 2 weeks  Please list any medications you are currently taking for this condition.: None